# Patient Record
Sex: MALE | Race: WHITE | NOT HISPANIC OR LATINO | Employment: FULL TIME | URBAN - METROPOLITAN AREA
[De-identification: names, ages, dates, MRNs, and addresses within clinical notes are randomized per-mention and may not be internally consistent; named-entity substitution may affect disease eponyms.]

---

## 2017-06-02 ENCOUNTER — CONVERSION ENCOUNTER (OUTPATIENT)
Dept: ORTHOPEDIC SURGERY | Facility: CLINIC | Age: 47
End: 2017-06-02

## 2017-07-12 ENCOUNTER — CONVERSION ENCOUNTER (OUTPATIENT)
Dept: ORTHOPEDIC SURGERY | Facility: CLINIC | Age: 47
End: 2017-07-12

## 2017-07-13 ENCOUNTER — HOSPITAL ENCOUNTER (OUTPATIENT)
Dept: PHYSICAL THERAPY | Facility: HOSPITAL | Age: 47
Setting detail: RECURRING SERIES
Discharge: HOME | End: 2017-09-01
Attending: FAMILY MEDICINE | Admitting: FAMILY MEDICINE

## 2018-10-23 ENCOUNTER — HOSPITAL ENCOUNTER (OUTPATIENT)
Dept: OTHER | Facility: HOSPITAL | Age: 48
Discharge: HOME | End: 2018-10-23
Attending: NURSE PRACTITIONER | Admitting: NURSE PRACTITIONER

## 2018-10-23 ENCOUNTER — CONVERSION ENCOUNTER (OUTPATIENT)
Dept: ORTHOPEDIC SURGERY | Facility: CLINIC | Age: 48
End: 2018-10-23

## 2018-11-14 ENCOUNTER — CONVERSION ENCOUNTER (OUTPATIENT)
Dept: ORTHOPEDIC SURGERY | Facility: CLINIC | Age: 48
End: 2018-11-14

## 2018-11-19 ENCOUNTER — HOSPITAL ENCOUNTER (OUTPATIENT)
Dept: MRI IMAGING | Facility: HOSPITAL | Age: 48
Discharge: HOME | End: 2018-11-19
Attending: ORTHOPAEDIC SURGERY | Admitting: ORTHOPAEDIC SURGERY

## 2018-11-28 ENCOUNTER — CONVERSION ENCOUNTER (OUTPATIENT)
Dept: ORTHOPEDIC SURGERY | Facility: CLINIC | Age: 48
End: 2018-11-28

## 2018-12-05 ENCOUNTER — HOSPITAL ENCOUNTER (OUTPATIENT)
Dept: PHYSICAL THERAPY | Facility: HOSPITAL | Age: 48
Setting detail: RECURRING SERIES
Discharge: HOME | End: 2018-12-31
Attending: ORTHOPAEDIC SURGERY | Admitting: ORTHOPAEDIC SURGERY

## 2019-01-02 ENCOUNTER — CONVERSION ENCOUNTER (OUTPATIENT)
Dept: ORTHOPEDIC SURGERY | Facility: CLINIC | Age: 49
End: 2019-01-02

## 2019-06-04 VITALS
BODY MASS INDEX: 25.06 KG/M2 | HEIGHT: 72 IN | BODY MASS INDEX: 25.06 KG/M2 | RESPIRATION RATE: 18 BRPM | WEIGHT: 185 LBS | HEIGHT: 72 IN | DIASTOLIC BLOOD PRESSURE: 80 MMHG | DIASTOLIC BLOOD PRESSURE: 80 MMHG | BODY MASS INDEX: 24.68 KG/M2 | HEART RATE: 80 BPM | SYSTOLIC BLOOD PRESSURE: 121 MMHG | WEIGHT: 182.2 LBS | SYSTOLIC BLOOD PRESSURE: 136 MMHG | WEIGHT: 175.5 LBS | OXYGEN SATURATION: 95 % | OXYGEN SATURATION: 95 % | HEART RATE: 76 BPM | RESPIRATION RATE: 16 BRPM | WEIGHT: 185 LBS | HEART RATE: 89 BPM | WEIGHT: 185 LBS | SYSTOLIC BLOOD PRESSURE: 133 MMHG | DIASTOLIC BLOOD PRESSURE: 82 MMHG | OXYGEN SATURATION: 96 % | RESPIRATION RATE: 18 BRPM | HEIGHT: 72 IN | WEIGHT: 180.6 LBS | SYSTOLIC BLOOD PRESSURE: 127 MMHG | DIASTOLIC BLOOD PRESSURE: 81 MMHG | HEART RATE: 85 BPM | SYSTOLIC BLOOD PRESSURE: 120 MMHG | SYSTOLIC BLOOD PRESSURE: 130 MMHG | DIASTOLIC BLOOD PRESSURE: 77 MMHG | HEIGHT: 72 IN | HEART RATE: 82 BPM | DIASTOLIC BLOOD PRESSURE: 87 MMHG | BODY MASS INDEX: 25.06 KG/M2

## 2019-07-09 ENCOUNTER — OFFICE VISIT (OUTPATIENT)
Dept: FAMILY MEDICINE CLINIC | Facility: CLINIC | Age: 49
End: 2019-07-09

## 2019-07-09 VITALS
OXYGEN SATURATION: 95 % | RESPIRATION RATE: 16 BRPM | HEART RATE: 98 BPM | SYSTOLIC BLOOD PRESSURE: 116 MMHG | HEIGHT: 71 IN | DIASTOLIC BLOOD PRESSURE: 78 MMHG | BODY MASS INDEX: 25.48 KG/M2 | WEIGHT: 182 LBS | TEMPERATURE: 98.2 F

## 2019-07-09 DIAGNOSIS — E78.49 OTHER HYPERLIPIDEMIA: ICD-10-CM

## 2019-07-09 DIAGNOSIS — I10 ESSENTIAL HYPERTENSION: Primary | ICD-10-CM

## 2019-07-09 DIAGNOSIS — Z12.5 SCREENING PSA (PROSTATE SPECIFIC ANTIGEN): ICD-10-CM

## 2019-07-09 PROBLEM — E78.5 HYPERLIPIDEMIA: Status: ACTIVE | Noted: 2019-07-09

## 2019-07-09 PROBLEM — M75.100 NONTRAUMATIC TEAR OF ROTATOR CUFF: Status: ACTIVE | Noted: 2018-11-28

## 2019-07-09 PROCEDURE — 99213 OFFICE O/P EST LOW 20 MIN: CPT | Performed by: NURSE PRACTITIONER

## 2019-07-09 RX ORDER — MELOXICAM 15 MG/1
TABLET ORAL EVERY 24 HOURS
COMMUNITY
Start: 2018-11-28 | End: 2023-01-17

## 2019-07-09 RX ORDER — LISINOPRIL AND HYDROCHLOROTHIAZIDE 20; 12.5 MG/1; MG/1
TABLET ORAL
Refills: 3 | COMMUNITY
Start: 2019-04-15 | End: 2019-07-11 | Stop reason: SDUPTHER

## 2019-07-09 RX ORDER — METOPROLOL SUCCINATE 50 MG/1
TABLET, EXTENDED RELEASE ORAL
Refills: 3 | COMMUNITY
Start: 2019-04-15 | End: 2019-07-11 | Stop reason: SDUPTHER

## 2019-07-09 NOTE — PROGRESS NOTES
Chief Complaint   Patient presents with   • Hypertension        Subjective   Aditya Barnes is a 49 y.o. male who presents today for htn and chol fu.    HPI:  Pt is here to fu on b/p he is taking meds.  Doing well. He does not remember the chol being elevated.  He had a kidney stone last night he past on his own.  He is not scheduling the schedule surgery at this point.  Not fasting today  Aditya Barnes  has a past medical history of Hypertension and Smokeless tobacco use.    Allergies   Allergen Reactions   • Penicillin G Unknown (See Comments)       Current Outpatient Medications:   •  lisinopril-hydrochlorothiazide (PRINZIDE,ZESTORETIC) 20-12.5 MG per tablet, , Disp: , Rfl: 3  •  metoprolol succinate XL (TOPROL-XL) 50 MG 24 hr tablet, , Disp: , Rfl: 3  •  meloxicam (MOBIC) 15 MG tablet, Daily., Disp: , Rfl:   Past Medical History:   Diagnosis Date   • Hypertension    • Smokeless tobacco use      Past Surgical History:   Procedure Laterality Date   • CHOLECYSTECTOMY      [facility]   • CYSTOSCOPY BLADDER STONE LITHOTRIPSY      [facility]   • ROTATOR CUFF REPAIR      left     Social History     Socioeconomic History   • Marital status:      Spouse name: Not on file   • Number of children: Not on file   • Years of education: Not on file   • Highest education level: Not on file   Tobacco Use   • Smoking status: Never Smoker   • Smokeless tobacco: Current User   Substance and Sexual Activity   • Alcohol use: Yes   • Drug use: No     Family History   Problem Relation Age of Onset   • Thyroid disease Mother        Family history, surgical history, past medical history, Allergies and med's reviewed with patient today and updated in [employer] EMR.     ROS:  Review of Systems   Constitutional: Negative for fatigue.   Respiratory: Negative for cough.    Cardiovascular: Negative for chest pain.   Musculoskeletal: Positive for arthralgias.       OBJECTIVE:  Vitals:    07/09/19 1452   BP: 116/78   BP Location: Left arm   Patient Position:  "Sitting   Cuff Size: Small Adult   Pulse: 98   Resp: 16   Temp: 98.2 °F (36.8 °C)   TempSrc: Oral   SpO2: 95%   Weight: 82.6 kg (182 lb)   Height: 179.1 cm (70.5\")     Physical Exam   Constitutional: He appears well-developed and well-nourished.   Cardiovascular: Normal rate, regular rhythm and normal heart sounds. Exam reveals no gallop and no friction rub.   No murmur heard.  Pulmonary/Chest: Effort normal and breath sounds normal. No respiratory distress. He has no wheezes.   Skin: Skin is warm and dry.   Nursing note and vitals reviewed.      ASSESSMENT/ PLAN:    Aditya was seen today for hypertension.    Diagnoses and all orders for this visit:    Essential hypertension  -     Lipid Panel; Future  -     Comprehensive Metabolic Panel; Future    Other hyperlipidemia  -     Lipid Panel; Future  -     Comprehensive Metabolic Panel; Future    Screening PSA (prostate specific antigen)  -     PSA Screen; Future        Orders Placed Today:     No orders of the defined types were placed in this encounter.       Management Plan:     An After Visit Summary was printed and given to the patient at discharge.    Follow-up: Return in about 6 months (around 1/9/2020).    Martita Zhang, IMANI 7/9/2019 3:17 PM  This note was electronically signed.    "

## 2019-07-11 RX ORDER — LISINOPRIL AND HYDROCHLOROTHIAZIDE 20; 12.5 MG/1; MG/1
TABLET ORAL
Qty: 90 TABLET | Refills: 3 | Status: SHIPPED | OUTPATIENT
Start: 2019-07-11 | End: 2020-07-06

## 2019-07-11 RX ORDER — METOPROLOL SUCCINATE 50 MG/1
TABLET, EXTENDED RELEASE ORAL
Qty: 90 TABLET | Refills: 3 | Status: SHIPPED | OUTPATIENT
Start: 2019-07-11 | End: 2020-07-06

## 2019-07-11 NOTE — TELEPHONE ENCOUNTER
Patient came in and is [facility] he is going over there and waiting.

## 2019-07-14 ENCOUNTER — RESULTS ENCOUNTER (OUTPATIENT)
Dept: FAMILY MEDICINE CLINIC | Facility: CLINIC | Age: 49
End: 2019-07-14

## 2019-07-14 DIAGNOSIS — E78.49 OTHER HYPERLIPIDEMIA: ICD-10-CM

## 2019-07-14 DIAGNOSIS — I10 ESSENTIAL HYPERTENSION: ICD-10-CM

## 2019-07-14 DIAGNOSIS — Z12.5 SCREENING PSA (PROSTATE SPECIFIC ANTIGEN): ICD-10-CM

## 2019-07-19 LAB
ALBUMIN SERPL-MCNC: 4.6 G/DL (ref 3.5–5.5)
ALBUMIN/GLOB SERPL: 1.9 {RATIO} (ref 1.2–2.2)
ALP SERPL-CCNC: 53 IU/L (ref 39–117)
ALT SERPL-CCNC: 22 IU/L (ref 0–44)
AST SERPL-CCNC: 16 IU/L (ref 0–40)
BILIRUB SERPL-MCNC: 0.4 MG/DL (ref 0–1.2)
BUN SERPL-MCNC: 19 MG/DL (ref 6–24)
BUN/CREAT SERPL: 18 (ref 9–20)
CALCIUM SERPL-MCNC: 9.7 MG/DL (ref 8.7–10.2)
CHLORIDE SERPL-SCNC: 103 MMOL/L (ref 96–106)
CHOLEST SERPL-MCNC: 145 MG/DL (ref 100–199)
CO2 SERPL-SCNC: 23 MMOL/L (ref 20–29)
CREAT SERPL-MCNC: 1.04 MG/DL (ref 0.76–1.27)
GLOBULIN SER CALC-MCNC: 2.4 G/DL (ref 1.5–4.5)
GLUCOSE SERPL-MCNC: 96 MG/DL (ref 65–99)
HDLC SERPL-MCNC: 33 MG/DL
LDLC SERPL CALC-MCNC: 94 MG/DL (ref 0–99)
POTASSIUM SERPL-SCNC: 4.1 MMOL/L (ref 3.5–5.2)
PROT SERPL-MCNC: 7 G/DL (ref 6–8.5)
PSA SERPL-MCNC: 0.8 NG/ML (ref 0–4)
SODIUM SERPL-SCNC: 142 MMOL/L (ref 134–144)
TRIGL SERPL-MCNC: 88 MG/DL (ref 0–149)
VLDLC SERPL CALC-MCNC: 18 MG/DL (ref 5–40)

## 2019-07-22 ENCOUNTER — TELEPHONE (OUTPATIENT)
Dept: FAMILY MEDICINE CLINIC | Facility: CLINIC | Age: 49
End: 2019-07-22

## 2019-07-22 NOTE — TELEPHONE ENCOUNTER
----- Message from IMANI Cortes sent at 7/22/2019  8:28 AM EDT -----  Please inform pt the labs all look normal.  His triglycerides were normal this yr.  His good chol was 33 and it should be above 39.  Exercise and a health diet will improve that.  All others look great

## 2020-01-13 ENCOUNTER — OFFICE VISIT (OUTPATIENT)
Dept: FAMILY MEDICINE CLINIC | Facility: CLINIC | Age: 50
End: 2020-01-13

## 2020-01-13 VITALS
RESPIRATION RATE: 16 BRPM | BODY MASS INDEX: 25.48 KG/M2 | SYSTOLIC BLOOD PRESSURE: 133 MMHG | WEIGHT: 182 LBS | HEIGHT: 71 IN | OXYGEN SATURATION: 99 % | TEMPERATURE: 98 F | DIASTOLIC BLOOD PRESSURE: 86 MMHG | HEART RATE: 69 BPM

## 2020-01-13 DIAGNOSIS — I10 ESSENTIAL HYPERTENSION: Primary | ICD-10-CM

## 2020-01-13 PROCEDURE — 99212 OFFICE O/P EST SF 10 MIN: CPT | Performed by: NURSE PRACTITIONER

## 2020-01-13 NOTE — ASSESSMENT & PLAN NOTE
Hypertension is improving with treatment.  Continue current medications.  Blood pressure will be reassessed August when physicals to.

## 2020-01-13 NOTE — PROGRESS NOTES
Chief Complaint   Patient presents with   • Hypertension        Subjective   Aditya Barnes is a 49 y.o. male who presents today for hypertension    HPI: Patient was started on lisinopril hydrochlorothiazide due to increase in blood pressure several months ago.  He is not had any side effects.  He also is taking his Toprol without any problems.  He has a good energy level.  With no chest pain shortness of breath or complications.  He had labs done in July.  He does not take his blood pressure at home    Aditya Barnes  has a past medical history of Hypertension and Smokeless tobacco use.    Allergies   Allergen Reactions   • Penicillin G Rash       Current Outpatient Medications:   •  lisinopril-hydrochlorothiazide (PRINZIDE,ZESTORETIC) 20-12.5 MG per tablet, TAKE 1 TABLET BY MOUTH IN THE MORNING, Disp: 90 tablet, Rfl: 3  •  metoprolol succinate XL (TOPROL-XL) 50 MG 24 hr tablet, TAKE 1 TABLET BY MOUTH ONCE DAILY, Disp: 90 tablet, Rfl: 3  •  meloxicam (MOBIC) 15 MG tablet, Daily., Disp: , Rfl:   Past Medical History:   Diagnosis Date   • Hypertension    • Smokeless tobacco use      Past Surgical History:   Procedure Laterality Date   • CHOLECYSTECTOMY      [facility]   • CYSTOSCOPY BLADDER STONE LITHOTRIPSY      [facility]   • ROTATOR CUFF REPAIR      left     Social History     Socioeconomic History   • Marital status:      Spouse name: Not on file   • Number of children: Not on file   • Years of education: Not on file   • Highest education level: Not on file   Tobacco Use   • Smoking status: Never Smoker   • Smokeless tobacco: Current User   Substance and Sexual Activity   • Alcohol use: Yes   • Drug use: No     Family History   Problem Relation Age of Onset   • Thyroid disease Mother        Family history, surgical history, past medical history, Allergies and med's reviewed with patient today and updated in [employer] EMR.   PHQ-2 Depression Screening  Little interest or pleasure in doing things?     Feeling down, depressed,  "or hopeless?     PHQ-2 Total Score     PHQ-9 Depression Screening  Little interest or pleasure in doing things?     Feeling down, depressed, or hopeless?     Trouble falling or staying asleep, or sleeping too much?     Feeling tired or having little energy?     Poor appetite or overeating?     Feeling bad about yourself - or that you are a failure or have let yourself or your family down?     Trouble concentrating on things, such as reading the newspaper or watching television?     Moving or speaking so slowly that other people could have noticed? Or the opposite - being so fidgety or restless that you have been moving around a lot more than usual?     Thoughts that you would be better off dead, or of hurting yourself in some way?     PHQ-9 Total Score     If you checked off any problems, how difficult have these problems made it for you to do your work, take care of things at home, or get along with other people?       ROS:  Review of Systems   Constitutional: Negative for fatigue.   Respiratory: Negative for cough and shortness of breath.    Cardiovascular: Negative for chest pain.   Musculoskeletal: Negative for arthralgias.       OBJECTIVE:  Vitals:    01/13/20 1607   BP: 133/86   BP Location: Right arm   Patient Position: Sitting   Cuff Size: Adult   Pulse: 69   Resp: 16   Temp: 98 °F (36.7 °C)   TempSrc: Oral   SpO2: 99%   Weight: 82.6 kg (182 lb)   Height: 179.1 cm (70.5\")     Physical Exam   Constitutional: He appears well-developed and well-nourished.   Cardiovascular: Normal rate, regular rhythm and normal heart sounds. Exam reveals no gallop and no friction rub.   No murmur heard.  Pulmonary/Chest: Effort normal and breath sounds normal. He has no wheezes. He has no rales.   Neurological: He is alert.   Skin: Skin is warm and dry.   Nursing note and vitals reviewed.      ASSESSMENT/ PLAN:    Aditya was seen today for hypertension.    Diagnoses and all orders for this visit:    Essential " hypertension  Comments:  Discussed medication. monitoring of blood pressure.  Blood pressure is stable recheck in the summer.        Orders Placed Today:     No orders of the defined types were placed in this encounter.       Management Plan:     An After Visit Summary was printed and given to the patient at discharge.    Follow-up: Return in about 7 months (around 8/13/2020).    IMANI Moncada 1/13/2020 5:14 PM  This note was electronically signed.

## 2020-06-08 ENCOUNTER — TELEPHONE (OUTPATIENT)
Dept: FAMILY MEDICINE CLINIC | Facility: CLINIC | Age: 50
End: 2020-06-08

## 2020-06-08 NOTE — TELEPHONE ENCOUNTER
Can you please get the records from the ER?  Inform the patient that if he wants to go directly to urology that is fine with me but I need to get the CT and records from [facility] to refer him.  If he needs to see me this week that is fine as well.

## 2020-06-08 NOTE — TELEPHONE ENCOUNTER
Seen in [facility] ED for kidney stone.  Do we need to sent over a referral for urology or does he need an appt with you?

## 2020-06-09 ENCOUNTER — TELEPHONE (OUTPATIENT)
Dept: FAMILY MEDICINE CLINIC | Facility: CLINIC | Age: 50
End: 2020-06-09

## 2020-06-09 PROBLEM — N20.0 KIDNEY STONES: Status: ACTIVE | Noted: 2020-06-09

## 2020-06-09 NOTE — TELEPHONE ENCOUNTER
I have put in a urology referral after reviewing patient's chart from the emergency department.  He needs to see urology as soon as possible.  If someone can see him today he can cancel his appointment here and be seen there.  His kidney stone is too large to pass.  I have the CT to be faxed.  Please let me know

## 2020-06-09 NOTE — TELEPHONE ENCOUNTER
Patient scheduled to see Dr. Cantu at 1:30pm today in [place] office. Patient notified and Records faxed to [facility]. Cancelled appointment for today with Martita.

## 2020-06-18 PROCEDURE — 82365 CALCULUS SPECTROSCOPY: CPT | Performed by: NEUROLOGICAL SURGERY

## 2020-06-19 ENCOUNTER — LAB REQUISITION (OUTPATIENT)
Dept: LAB | Facility: HOSPITAL | Age: 50
End: 2020-06-19

## 2020-06-19 DIAGNOSIS — N20.1 CALCULUS OF URETER: ICD-10-CM

## 2020-07-02 LAB
COD CRY STONE QL IR: 10 %
COLOR STONE: NORMAL
COM CRY STONE QL IR: 90 %
COMPN STONE: NORMAL
LABORATORY COMMENT REPORT: NORMAL
Lab: NORMAL
Lab: NORMAL
PHOTO: NORMAL
SIZE STONE: NORMAL MM
SPECIMEN SOURCE: NORMAL
WT STONE: 99 MG

## 2020-07-06 RX ORDER — LISINOPRIL AND HYDROCHLOROTHIAZIDE 20; 12.5 MG/1; MG/1
TABLET ORAL
Qty: 90 TABLET | Refills: 0 | Status: SHIPPED | OUTPATIENT
Start: 2020-07-06 | End: 2020-10-12

## 2020-07-06 RX ORDER — METOPROLOL SUCCINATE 50 MG/1
TABLET, EXTENDED RELEASE ORAL
Qty: 90 TABLET | Refills: 0 | Status: SHIPPED | OUTPATIENT
Start: 2020-07-06 | End: 2020-10-12

## 2020-07-13 ENCOUNTER — OFFICE VISIT (OUTPATIENT)
Dept: FAMILY MEDICINE CLINIC | Facility: CLINIC | Age: 50
End: 2020-07-13

## 2020-07-13 VITALS
TEMPERATURE: 97.5 F | SYSTOLIC BLOOD PRESSURE: 116 MMHG | HEIGHT: 71 IN | RESPIRATION RATE: 16 BRPM | WEIGHT: 175 LBS | DIASTOLIC BLOOD PRESSURE: 76 MMHG | HEART RATE: 67 BPM | BODY MASS INDEX: 24.5 KG/M2 | OXYGEN SATURATION: 96 %

## 2020-07-13 DIAGNOSIS — I10 ESSENTIAL HYPERTENSION: Primary | ICD-10-CM

## 2020-07-13 DIAGNOSIS — E78.49 OTHER HYPERLIPIDEMIA: ICD-10-CM

## 2020-07-13 DIAGNOSIS — Z12.5 SCREENING PSA (PROSTATE SPECIFIC ANTIGEN): ICD-10-CM

## 2020-07-13 PROCEDURE — 99213 OFFICE O/P EST LOW 20 MIN: CPT | Performed by: NURSE PRACTITIONER

## 2020-07-13 NOTE — PROGRESS NOTES
Chief Complaint   Patient presents with   • Hypertension        Subjective   Aditya Barnes is a 50 y.o. male who presents today for hypertension, hyperlipidemia    HPI: Patient is here to follow-up on blood pressure and hyperlipidemia.  He is due for labs at this time.  He is also due a PSA.  He is declined a colonoscopy at this time.  He recently had a kidney stone that was removed and he is doing well at this point.  His blood pressure is under good control and he has no side effects from the medication.  He does have a question about weight loss he is to quit drinking beer at night and has lost about 8 pounds.  He has no other symptoms are changes in his diet or exercise level.  He wants to know if the stopping drinking alcohol can account for that.    Aditya Barnes  has a past medical history of Hyperlipidemia, Hypertension, and Smokeless tobacco use.    Allergies   Allergen Reactions   • Penicillin G Rash       Current Outpatient Medications:   •  lisinopril-hydrochlorothiazide (PRINZIDE,ZESTORETIC) 20-12.5 MG per tablet, TAKE 1 TABLET BY MOUTH IN THE MORNING, Disp: 90 tablet, Rfl: 0  •  metoprolol succinate XL (TOPROL-XL) 50 MG 24 hr tablet, Take 1 tablet by mouth once daily, Disp: 90 tablet, Rfl: 0  •  diclofenac (VOLTAREN) 75 MG EC tablet, , Disp: , Rfl:   •  meloxicam (MOBIC) 15 MG tablet, Daily., Disp: , Rfl:   •  promethazine (PHENERGAN) 25 MG tablet, , Disp: , Rfl:   Past Medical History:   Diagnosis Date   • Hyperlipidemia    • Hypertension    • Smokeless tobacco use      Past Surgical History:   Procedure Laterality Date   • CHOLECYSTECTOMY      [facility]   • CYSTOSCOPY BLADDER STONE LITHOTRIPSY      [facility]   • ROTATOR CUFF REPAIR      left     Social History     Socioeconomic History   • Marital status:      Spouse name: Not on file   • Number of children: Not on file   • Years of education: Not on file   • Highest education level: Not on file   Tobacco Use   • Smoking status: Never Smoker   •  Smokeless tobacco: Current User     Types: Snuff   Substance and Sexual Activity   • Alcohol use: Yes     Comment: social   • Drug use: No     Family History   Problem Relation Age of Onset   • Thyroid disease Mother        Family history, surgical history, past medical history, Allergies and med's reviewed with patient today and updated in [employer] EMR.   PHQ-2 Depression Screening  Little interest or pleasure in doing things? 0   Feeling down, depressed, or hopeless? 0   PHQ-2 Total Score 0   PHQ-9 Depression Screening  Little interest or pleasure in doing things? 0   Feeling down, depressed, or hopeless? 0   Trouble falling or staying asleep, or sleeping too much?     Feeling tired or having little energy?     Poor appetite or overeating?     Feeling bad about yourself - or that you are a failure or have let yourself or your family down?     Trouble concentrating on things, such as reading the newspaper or watching television?     Moving or speaking so slowly that other people could have noticed? Or the opposite - being so fidgety or restless that you have been moving around a lot more than usual?     Thoughts that you would be better off dead, or of hurting yourself in some way?     PHQ-9 Total Score 0   If you checked off any problems, how difficult have these problems made it for you to do your work, take care of things at home, or get along with other people?       ROS:  Review of Systems   Constitutional: Negative for fatigue.   Respiratory: Negative for cough and shortness of breath.    Cardiovascular: Negative for chest pain.   Gastrointestinal: Negative for abdominal pain and constipation.   Genitourinary: Negative for dysuria and frequency.   Musculoskeletal: Negative for arthralgias.       OBJECTIVE:  Vitals:    07/13/20 1614   BP: 116/76   BP Location: Left arm   Patient Position: Sitting   Cuff Size: Adult   Pulse: 67   Resp: 16   Temp: 97.5 °F (36.4 °C)   TempSrc: Temporal   SpO2: 96%   Weight: 79.4 kg  "(175 lb)   Height: 179.1 cm (70.5\")     Physical Exam   Constitutional: He appears well-developed and well-nourished.   Cardiovascular: Normal rate, regular rhythm and normal heart sounds. Exam reveals no gallop and no friction rub.   No murmur heard.  Pulmonary/Chest: Effort normal and breath sounds normal. He has no wheezes. He has no rales.   Neurological: He is alert.   Skin: Skin is warm and dry.   Nursing note and vitals reviewed.      ASSESSMENT/ PLAN:    Aditya was seen today for hypertension.    Diagnoses and all orders for this visit:    Essential hypertension  Comments:  Continue current blood pressure medications.  Labs as discussed.  Orders:  -     Comprehensive Metabolic Panel  -     Lipid Panel    Other hyperlipidemia  -     Comprehensive Metabolic Panel  -     Lipid Panel    Screening PSA (prostate specific antigen)  -     PSA Screen        Orders Placed Today:     No orders of the defined types were placed in this encounter.       Management Plan:     An After Visit Summary was printed and given to the patient at discharge.    Follow-up: Return in about 1 year (around 7/13/2021) for Recheck htn.    IMANI Moncada 7/13/2020 16:32  This note was electronically signed.    "

## 2020-10-12 RX ORDER — METOPROLOL SUCCINATE 50 MG/1
TABLET, EXTENDED RELEASE ORAL
Qty: 90 TABLET | Refills: 0 | Status: SHIPPED | OUTPATIENT
Start: 2020-10-12 | End: 2021-01-06 | Stop reason: SDUPTHER

## 2020-10-12 RX ORDER — LISINOPRIL AND HYDROCHLOROTHIAZIDE 20; 12.5 MG/1; MG/1
TABLET ORAL
Qty: 90 TABLET | Refills: 0 | Status: SHIPPED | OUTPATIENT
Start: 2020-10-12 | End: 2021-01-06 | Stop reason: SDUPTHER

## 2021-01-06 ENCOUNTER — OFFICE VISIT (OUTPATIENT)
Dept: FAMILY MEDICINE CLINIC | Facility: CLINIC | Age: 51
End: 2021-01-06

## 2021-01-06 VITALS
WEIGHT: 183 LBS | SYSTOLIC BLOOD PRESSURE: 123 MMHG | OXYGEN SATURATION: 97 % | HEIGHT: 71 IN | DIASTOLIC BLOOD PRESSURE: 80 MMHG | BODY MASS INDEX: 25.62 KG/M2 | RESPIRATION RATE: 16 BRPM | HEART RATE: 71 BPM | TEMPERATURE: 97.8 F

## 2021-01-06 DIAGNOSIS — I10 ESSENTIAL HYPERTENSION: Primary | Chronic | ICD-10-CM

## 2021-01-06 DIAGNOSIS — E78.49 OTHER HYPERLIPIDEMIA: ICD-10-CM

## 2021-01-06 DIAGNOSIS — Z12.5 SCREENING PSA (PROSTATE SPECIFIC ANTIGEN): ICD-10-CM

## 2021-01-06 PROCEDURE — 99213 OFFICE O/P EST LOW 20 MIN: CPT | Performed by: NURSE PRACTITIONER

## 2021-01-06 RX ORDER — METOPROLOL SUCCINATE 50 MG/1
50 TABLET, EXTENDED RELEASE ORAL DAILY
Qty: 90 TABLET | Refills: 3 | Status: SHIPPED | OUTPATIENT
Start: 2021-01-06 | End: 2021-12-29 | Stop reason: SDUPTHER

## 2021-01-06 RX ORDER — LISINOPRIL AND HYDROCHLOROTHIAZIDE 20; 12.5 MG/1; MG/1
1 TABLET ORAL EVERY MORNING
Qty: 90 TABLET | Refills: 3 | Status: SHIPPED | OUTPATIENT
Start: 2021-01-06 | End: 2021-12-29 | Stop reason: SDUPTHER

## 2021-01-06 NOTE — PROGRESS NOTES
"Chief Complaint  Hypertension    Subjective          Aditya Barnes presents to [facility] for hypertensive recheck  History of Present Illness    Patient here for 6-month follow-up on his blood pressure he is adherent to his medications without any side effect.  He did not get his labs drawn the last time he was here.  He has no other complaints.  Previously had a kidney stone has not had any recurrence.    Objective   Vital Signs:   /80 (BP Location: Left arm, Patient Position: Sitting, Cuff Size: Adult)   Pulse 71   Temp 97.8 °F (36.6 °C) (Infrared)   Resp 16   Ht 179.1 cm (70.5\")   Wt 83 kg (183 lb)   SpO2 97%   BMI 25.89 kg/m²     Physical Exam  Vitals signs and nursing note reviewed.   Constitutional:       Appearance: He is well-developed.   Cardiovascular:      Rate and Rhythm: Normal rate and regular rhythm.      Heart sounds: Normal heart sounds. No murmur. No friction rub. No gallop.    Pulmonary:      Effort: Pulmonary effort is normal.      Breath sounds: Normal breath sounds. No wheezing or rales.   Skin:     General: Skin is warm and dry.   Neurological:      Mental Status: He is alert.        Result Review :                 Assessment and Plan    Problem List Items Addressed This Visit        Active Problems    Hypertension - Primary    Relevant Medications    metoprolol succinate XL (TOPROL-XL) 50 MG 24 hr tablet    lisinopril-hydrochlorothiazide (PRINZIDE,ZESTORETIC) 20-12.5 MG per tablet    Other Relevant Orders    Comprehensive Metabolic Panel    Lipid Panel    Hyperlipidemia    Relevant Orders    Comprehensive Metabolic Panel    Lipid Panel    Screening PSA (prostate specific antigen)    Relevant Orders    PSA Screen          Follow Up   Return in about 1 year (around 1/6/2022) for Recheck htn.  Patient was given instructions and counseling regarding his condition or for health maintenance advice. Please see specific information pulled into the AVS " if appropriate.

## 2021-01-16 LAB
ALBUMIN SERPL-MCNC: 4.6 G/DL (ref 3.8–4.9)
ALBUMIN/GLOB SERPL: 1.8 {RATIO} (ref 1.2–2.2)
ALP SERPL-CCNC: 57 IU/L (ref 39–117)
ALT SERPL-CCNC: 26 IU/L (ref 0–44)
AST SERPL-CCNC: 17 IU/L (ref 0–40)
BILIRUB SERPL-MCNC: 0.4 MG/DL (ref 0–1.2)
BUN SERPL-MCNC: 20 MG/DL (ref 6–24)
BUN/CREAT SERPL: 18 (ref 9–20)
CALCIUM SERPL-MCNC: 9.5 MG/DL (ref 8.7–10.2)
CHLORIDE SERPL-SCNC: 107 MMOL/L (ref 96–106)
CHOLEST SERPL-MCNC: 159 MG/DL (ref 100–199)
CO2 SERPL-SCNC: 24 MMOL/L (ref 20–29)
CREAT SERPL-MCNC: 1.1 MG/DL (ref 0.76–1.27)
GLOBULIN SER CALC-MCNC: 2.5 G/DL (ref 1.5–4.5)
GLUCOSE SERPL-MCNC: 103 MG/DL (ref 65–99)
HDLC SERPL-MCNC: 38 MG/DL
LDLC SERPL CALC-MCNC: 101 MG/DL (ref 0–99)
POTASSIUM SERPL-SCNC: 4.2 MMOL/L (ref 3.5–5.2)
PROT SERPL-MCNC: 7.1 G/DL (ref 6–8.5)
PSA SERPL-MCNC: 0.8 NG/ML (ref 0–4)
SODIUM SERPL-SCNC: 143 MMOL/L (ref 134–144)
TRIGL SERPL-MCNC: 110 MG/DL (ref 0–149)
VLDLC SERPL CALC-MCNC: 20 MG/DL (ref 5–40)

## 2021-12-29 ENCOUNTER — TELEPHONE (OUTPATIENT)
Dept: FAMILY MEDICINE CLINIC | Facility: CLINIC | Age: 51
End: 2021-12-29

## 2021-12-29 ENCOUNTER — OFFICE VISIT (OUTPATIENT)
Dept: FAMILY MEDICINE CLINIC | Facility: CLINIC | Age: 51
End: 2021-12-29

## 2021-12-29 VITALS
OXYGEN SATURATION: 97 % | BODY MASS INDEX: 26.6 KG/M2 | HEIGHT: 71 IN | HEART RATE: 86 BPM | DIASTOLIC BLOOD PRESSURE: 86 MMHG | SYSTOLIC BLOOD PRESSURE: 120 MMHG | TEMPERATURE: 97.6 F | WEIGHT: 190 LBS | RESPIRATION RATE: 16 BRPM

## 2021-12-29 DIAGNOSIS — Z12.5 SCREENING PSA (PROSTATE SPECIFIC ANTIGEN): ICD-10-CM

## 2021-12-29 DIAGNOSIS — Z53.20 COLON CANCER SCREENING DECLINED: ICD-10-CM

## 2021-12-29 DIAGNOSIS — F17.200 TOBACCO DEPENDENCE SYNDROME: ICD-10-CM

## 2021-12-29 DIAGNOSIS — E78.49 OTHER HYPERLIPIDEMIA: ICD-10-CM

## 2021-12-29 DIAGNOSIS — I10 PRIMARY HYPERTENSION: Primary | ICD-10-CM

## 2021-12-29 DIAGNOSIS — R63.5 WEIGHT GAIN: ICD-10-CM

## 2021-12-29 PROCEDURE — 99214 OFFICE O/P EST MOD 30 MIN: CPT | Performed by: NURSE PRACTITIONER

## 2021-12-29 RX ORDER — METOPROLOL SUCCINATE 50 MG/1
50 TABLET, EXTENDED RELEASE ORAL DAILY
Qty: 90 TABLET | Refills: 3 | Status: SHIPPED | OUTPATIENT
Start: 2021-12-29 | End: 2023-01-08

## 2021-12-29 RX ORDER — LISINOPRIL AND HYDROCHLOROTHIAZIDE 20; 12.5 MG/1; MG/1
1 TABLET ORAL EVERY MORNING
Qty: 90 TABLET | Refills: 3 | Status: SHIPPED | OUTPATIENT
Start: 2021-12-29 | End: 2023-01-08

## 2021-12-29 NOTE — ASSESSMENT & PLAN NOTE
Tobacco use is unchanged.  Not motivated  Tobacco use will be reassessed at the next regular appointment.

## 2021-12-29 NOTE — PROGRESS NOTES
Chief Complaint  Hypertension and Hyperlipidemia    Subjective          Aditya Barnes presents to [facility] for hypertension, hyperlipidemia    History of Present Illness    Patient is here for follow-up on hypertension. He is taking his medicines compliantly. He is declining colonoscopy or Cologuard. He does report he has had some weight gain that he thought was disproportional what he is eating. His mother has thyroid disorder. He is not excessively fatigued but would like that checked. He is not due for his PSA till January. He has no symptoms of BPH. He has had some hyperlipidemia that needs to be rechecked and he is supposed to be managed that by diet and exercise. He does continue to use smokeless tobacco.      Aditya Barnes  has a past medical history of Hyperlipidemia, Hypertension, and Smokeless tobacco use.      Review of Systems   Constitutional: Positive for unexpected weight change. Negative for fatigue.   HENT: Negative for ear pain, sinus pain and sore throat.    Eyes: Negative for pain.   Respiratory: Negative for cough and shortness of breath.    Cardiovascular: Negative for chest pain.   Gastrointestinal: Negative for abdominal pain, diarrhea and nausea.   Endocrine: Negative for polyuria.   Genitourinary: Negative for decreased urine volume and dysuria.   Musculoskeletal: Negative for arthralgias.   Skin: Negative for rash.   Allergic/Immunologic: Negative for environmental allergies.   Neurological: Negative for dizziness, numbness and headaches.   Hematological: Does not bruise/bleed easily.   Psychiatric/Behavioral: The patient is not nervous/anxious.         Objective       Current Outpatient Medications:   •  lisinopril-hydrochlorothiazide (PRINZIDE,ZESTORETIC) 20-12.5 MG per tablet, Take 1 tablet by mouth Every Morning., Disp: 90 tablet, Rfl: 3  •  metoprolol succinate XL (TOPROL-XL) 50 MG 24 hr tablet, Take 1 tablet by mouth Daily., Disp: 90 tablet, Rfl:  "3  •  diclofenac (VOLTAREN) 75 MG EC tablet, , Disp: , Rfl:   •  meloxicam (MOBIC) 15 MG tablet, Daily., Disp: , Rfl:     Vital Signs:      /86 (BP Location: Right arm, Patient Position: Sitting, Cuff Size: Adult)   Pulse 86   Temp 97.6 °F (36.4 °C) (Infrared)   Resp 16   Ht 179.1 cm (70.5\")   Wt 86.2 kg (190 lb)   SpO2 97%   BMI 26.88 kg/m²     Vitals:    12/29/21 0917   BP: 120/86   BP Location: Right arm   Patient Position: Sitting   Cuff Size: Adult   Pulse: 86   Resp: 16   Temp: 97.6 °F (36.4 °C)   TempSrc: Infrared   SpO2: 97%   Weight: 86.2 kg (190 lb)   Height: 179.1 cm (70.5\")      Physical Exam  Vitals and nursing note reviewed.   Constitutional:       Appearance: He is well-developed.   HENT:      Head: Normocephalic.   Cardiovascular:      Rate and Rhythm: Normal rate and regular rhythm.      Heart sounds: Normal heart sounds. No murmur heard.  No friction rub. No gallop.    Pulmonary:      Effort: Pulmonary effort is normal.      Breath sounds: Normal breath sounds. No wheezing or rales.   Abdominal:      General: Bowel sounds are normal.      Palpations: Abdomen is soft.      Tenderness: There is no abdominal tenderness.   Musculoskeletal:      Cervical back: Normal range of motion and neck supple. No tenderness.   Skin:     General: Skin is warm and dry.   Neurological:      Mental Status: He is alert.   Psychiatric:         Mood and Affect: Mood normal.         Behavior: Behavior normal.         Thought Content: Thought content normal.         Judgment: Judgment normal.        Result Review :                PHQ-9 Depression Screening  Little interest or pleasure in doing things?     Feeling down, depressed, or hopeless?     Trouble falling or staying asleep, or sleeping too much?     Feeling tired or having little energy?     Poor appetite or overeating?     Feeling bad about yourself - or that you are a failure or have let yourself or your family down?     Trouble concentrating on " things, such as reading the newspaper or watching television?     Moving or speaking so slowly that other people could have noticed? Or the opposite - being so fidgety or restless that you have been moving around a lot more than usual?     Thoughts that you would be better off dead, or of hurting yourself in some way?     PHQ-9 Total Score     If you checked off any problems, how difficult have these problems made it for you to do your work, take care of things at home, or get along with other people?             Assessment and Plan    Diagnoses and all orders for this visit:    1. Primary hypertension (Primary)  Assessment & Plan:  Hypertension is improving with treatment.  Continue current treatment regimen.  Blood pressure will be reassessed at the next regular appointment.    Orders:  -     Comprehensive Metabolic Panel    2. Other hyperlipidemia  Assessment & Plan:  Lipid abnormalities are unchanged.  rechecking labs, discussed management  Lipids will be reassessed in 1 year.    Orders:  -     Lipid Panel    3. Weight gain  -     TSH    4. Screening PSA (prostate specific antigen)  -     PSA Screen    5. Tobacco dependence syndrome  Assessment & Plan:  Tobacco use is unchanged.  Not motivated  Tobacco use will be reassessed at the next regular appointment.      6. Colon cancer screening declined       Problem List Items Addressed This Visit        Active Problems    Hypertension - Primary    Current Assessment & Plan     Hypertension is improving with treatment.  Continue current treatment regimen.  Blood pressure will be reassessed at the next regular appointment.         Relevant Orders    Comprehensive Metabolic Panel    Tobacco dependence syndrome    Current Assessment & Plan     Tobacco use is unchanged.  Not motivated  Tobacco use will be reassessed at the next regular appointment.         Hyperlipidemia    Current Assessment & Plan     Lipid abnormalities are unchanged.  rechecking labs, discussed  management  Lipids will be reassessed in 1 year.         Relevant Orders    Lipid Panel    Screening PSA (prostate specific antigen)    Relevant Orders    PSA Screen      Other Visit Diagnoses     Weight gain        Relevant Orders    TSH    Colon cancer screening declined              Follow Up   No follow-ups on file.  Patient was given instructions and counseling regarding his condition or for health maintenance advice. Please see specific information pulled into the AVS if appropriate.

## 2021-12-29 NOTE — ASSESSMENT & PLAN NOTE
Lipid abnormalities are unchanged.  rechecking labs, discussed management  Lipids will be reassessed in 1 year.

## 2022-01-20 ENCOUNTER — TELEPHONE (OUTPATIENT)
Dept: FAMILY MEDICINE CLINIC | Facility: CLINIC | Age: 52
End: 2022-01-20

## 2022-01-20 NOTE — TELEPHONE ENCOUNTER
----- Message from Lubna Dhaliwal MA sent at 12/29/2021  9:33 AM EST -----  Regarding: Labs  Call patient to let him know he is due to repeat his PSA, lucas has put the order in already.

## 2022-02-02 ENCOUNTER — CLINICAL SUPPORT (OUTPATIENT)
Dept: FAMILY MEDICINE CLINIC | Facility: CLINIC | Age: 52
End: 2022-02-02

## 2022-02-02 DIAGNOSIS — R73.03 PRE-DIABETES: Primary | ICD-10-CM

## 2022-02-02 LAB
ALBUMIN SERPL-MCNC: 4.7 G/DL (ref 3.8–4.9)
ALBUMIN/GLOB SERPL: 2.1 {RATIO} (ref 1.2–2.2)
ALP SERPL-CCNC: 61 IU/L (ref 44–121)
ALT SERPL-CCNC: 30 IU/L (ref 0–44)
AST SERPL-CCNC: 17 IU/L (ref 0–40)
BILIRUB SERPL-MCNC: 0.4 MG/DL (ref 0–1.2)
BUN SERPL-MCNC: 17 MG/DL (ref 6–24)
BUN/CREAT SERPL: 15 (ref 9–20)
CALCIUM SERPL-MCNC: 9.3 MG/DL (ref 8.7–10.2)
CHLORIDE SERPL-SCNC: 104 MMOL/L (ref 96–106)
CHOLEST SERPL-MCNC: 154 MG/DL (ref 100–199)
CO2 SERPL-SCNC: 23 MMOL/L (ref 20–29)
CREAT SERPL-MCNC: 1.17 MG/DL (ref 0.76–1.27)
EXPIRATION DATE: NORMAL
GLOBULIN SER CALC-MCNC: 2.2 G/DL (ref 1.5–4.5)
GLUCOSE SERPL-MCNC: 109 MG/DL (ref 65–99)
HBA1C MFR BLD: 5.5 %
HDLC SERPL-MCNC: 31 MG/DL
LDLC SERPL CALC-MCNC: 101 MG/DL (ref 0–99)
Lab: 881
POTASSIUM SERPL-SCNC: 3.8 MMOL/L (ref 3.5–5.2)
PROT SERPL-MCNC: 6.9 G/DL (ref 6–8.5)
PSA SERPL-MCNC: 0.9 NG/ML (ref 0–4)
SODIUM SERPL-SCNC: 142 MMOL/L (ref 134–144)
TRIGL SERPL-MCNC: 120 MG/DL (ref 0–149)
TSH SERPL DL<=0.005 MIU/L-ACNC: 1.53 UIU/ML (ref 0.45–4.5)
VLDLC SERPL CALC-MCNC: 22 MG/DL (ref 5–40)

## 2022-02-02 PROCEDURE — 83036 HEMOGLOBIN GLYCOSYLATED A1C: CPT | Performed by: NURSE PRACTITIONER

## 2022-12-08 ENCOUNTER — TREATMENT (OUTPATIENT)
Dept: PHYSICAL THERAPY | Facility: CLINIC | Age: 52
End: 2022-12-08

## 2022-12-08 DIAGNOSIS — M54.2 NECK PAIN ON RIGHT SIDE: Primary | ICD-10-CM

## 2022-12-08 DIAGNOSIS — M54.12 RADICULOPATHY, CERVICAL: ICD-10-CM

## 2022-12-08 PROCEDURE — 97110 THERAPEUTIC EXERCISES: CPT | Performed by: PHYSICAL THERAPIST

## 2022-12-08 PROCEDURE — 97161 PT EVAL LOW COMPLEX 20 MIN: CPT | Performed by: PHYSICAL THERAPIST

## 2022-12-08 PROCEDURE — 97012 MECHANICAL TRACTION THERAPY: CPT | Performed by: PHYSICAL THERAPIST

## 2022-12-08 NOTE — PROGRESS NOTES
Physical Therapy Initial Evaluation and Plan of Care  [address], [address], [place]  [address]    Patient: Aditya Barnes   : 1970  Diagnosis/ICD-10 Code:  Neck pain on right side [M54.2]  Referring practitioner: self-referral  Date of Initial Visit: 2022  Today's Date: 2022  Patient seen for 1 sessions             Subjective Evaluation    History of Present Illness  Mechanism of injury: Patient is a 52 y.o. WM who returns to PT self-referred for R sided neck pain.  He reports previous relief with traction, manual therapy and heat.  Pain rating = 7/10 and increases with walking, driving, sleeping on sides, lifting.  Pain decreases with lying supine, moving.  Personal goals:  Decrease pain.  Works as manager at [employer].  He is R hand dominant.  History of L shoulder RCR.    Patient Goals  Patient goals for therapy: decreased pain             Objective NDI score indicates 20% impairment with limitations in walking, driving, sleeping on sides, lifting.  Cervical AROM is WNL all planes, extension increases pain.  B shoulders are WNL AROM.  MMT = 5/5 B UEs.  Tenderness and muscle guarding R UT, CPSM, subocc mm.  Forward head and protracted shoulders noted.        Assessment & Plan     Assessment  Impairments: abnormal muscle tone, activity intolerance, lacks appropriate home exercise program and pain with function  Functional Limitations: carrying objects, lifting, sleeping, walking, uncomfortable because of pain and standing  Goals  Plan Goals: Patient independent with HEP in 2 weeks  Tolerate 10 min Nustep in 2 weeks  Able to driving without increased pain in 2 weeks  Able to decrease pain and radicular symptoms 25% in 2 weeks  Able to decrease pain 50% in 12 weeks        Plan  Therapy options: will be seen for skilled therapy services  Planned modality interventions: thermotherapy (hydrocollator packs), traction and dry needling  Other planned modality interventions: physical modalities  as needed  Planned therapy interventions: manual therapy, strengthening, stretching, therapeutic activities, postural training, neuromuscular re-education, flexibility, functional ROM exercises, home exercise program and joint mobilization  Frequency: 2x week  Duration in weeks: 12  Treatment plan discussed with: patient        Timed:         Manual Therapy:         mins  25828;     Therapeutic Exercise:    15     mins  01879;     Neuromuscular Daly:        mins  26887;    Therapeutic Activity:          mins  37346;     Gait Training:           mins  23619;     Ultrasound:          mins  50239;    Self-care  ____ mins 04639    Un-Timed:  Electrical Stimulation:         mins  79082 ( );  Dry Needling          mins self-pay 20561  Traction     15     mins 18002  Low Eval      15    Mins  22534  Mod Eval          Mins  28318  Canal repositioning _____ mins  12908        Timed Treatment:   15   mins   Total Treatment:     45   mins    PT SIGNATURE: Robin A Sprigler, PT   [place] license # [id number]  Electronically signed by Robin A Sprigler, PT, 12/08/22, 4:37 PM EST    Initial Certification  Certification Period: 12/8/2022 through 3/7/2023  I certify that the therapy services are furnished while this patient is under my care.  The services outlined above are required by this patient, and will be reviewed every 90 days.     PHYSICIAN:  ______________________________________________________________________________________________     DATE: _________________________________________________________________________________________________________________________________    Please sign and return via fax to [phone]. Thank you, [facility].

## 2022-12-13 ENCOUNTER — OFFICE VISIT (OUTPATIENT)
Dept: FAMILY MEDICINE CLINIC | Facility: CLINIC | Age: 52
End: 2022-12-13

## 2022-12-13 VITALS
RESPIRATION RATE: 16 BRPM | DIASTOLIC BLOOD PRESSURE: 84 MMHG | SYSTOLIC BLOOD PRESSURE: 146 MMHG | HEIGHT: 71 IN | HEART RATE: 76 BPM | BODY MASS INDEX: 26.74 KG/M2 | WEIGHT: 191 LBS | OXYGEN SATURATION: 97 % | TEMPERATURE: 97.3 F

## 2022-12-13 DIAGNOSIS — Z12.11 SCREENING FOR COLON CANCER: ICD-10-CM

## 2022-12-13 DIAGNOSIS — R73.03 PRE-DIABETES: ICD-10-CM

## 2022-12-13 DIAGNOSIS — E78.49 OTHER HYPERLIPIDEMIA: ICD-10-CM

## 2022-12-13 DIAGNOSIS — Z00.00 ANNUAL PHYSICAL EXAM: Primary | ICD-10-CM

## 2022-12-13 DIAGNOSIS — I10 PRIMARY HYPERTENSION: ICD-10-CM

## 2022-12-13 DIAGNOSIS — F17.200 TOBACCO DEPENDENCE SYNDROME: ICD-10-CM

## 2022-12-13 DIAGNOSIS — M54.12 CERVICAL RADICULOPATHY: ICD-10-CM

## 2022-12-13 PROCEDURE — 99396 PREV VISIT EST AGE 40-64: CPT | Performed by: NURSE PRACTITIONER

## 2022-12-13 NOTE — PROGRESS NOTES
Chief Complaint  Annual Exam, Hypertension, and Hyperlipidemia    Subjective          Aditya Barnes presents to [facility] for annual exam, hypertension hyperlipidemia and neck pain.    History of Present Illness    Patient is here for an annual physical.  He is taking his hypertensive medicine.  He does need a recheck for his cholesterol today.  Trying to manage with diet.  He has no side effects or problems with medication    Patient has neck pain that is reoccurring starting last week.  It is mostly in the right trapezius and neck as well as radiation into the right upper arm.  He reports that he has aching in that arm.  He has had initial evaluation with therapy.  He is taking Mobic or ibuprofen.  He reports he has difficulty getting to sleep at night secondary to the pain.  He has no new injury.    Patient uses smokeless tobacco.        Aditya Barnes  has a past medical history of Hyperlipidemia, Hypertension, and Smokeless tobacco use.      Review of Systems   Constitutional: Negative for fatigue.   HENT: Negative for ear pain, sinus pain and sore throat.    Eyes: Negative for pain.   Respiratory: Negative for cough and shortness of breath.    Cardiovascular: Negative for chest pain.   Gastrointestinal: Negative for abdominal pain, diarrhea and nausea.   Endocrine: Negative for polyuria.   Genitourinary: Negative for decreased urine volume and dysuria.   Musculoskeletal: Positive for neck pain. Negative for arthralgias.   Skin: Negative for rash.   Allergic/Immunologic: Negative for environmental allergies.   Neurological: Negative for dizziness, numbness and headaches.   Hematological: Does not bruise/bleed easily.   Psychiatric/Behavioral: The patient is not nervous/anxious.         Objective       Current Outpatient Medications:   •  diclofenac (VOLTAREN) 75 MG EC tablet, , Disp: , Rfl:   •  lisinopril-hydrochlorothiazide (PRINZIDE,ZESTORETIC) 20-12.5 MG per tablet, Take 1  "tablet by mouth Every Morning., Disp: 90 tablet, Rfl: 3  •  meloxicam (MOBIC) 15 MG tablet, Daily., Disp: , Rfl:   •  metoprolol succinate XL (TOPROL-XL) 50 MG 24 hr tablet, Take 1 tablet by mouth Daily., Disp: 90 tablet, Rfl: 3    Vital Signs:      /84   Pulse 76   Temp 97.3 °F (36.3 °C) (Infrared)   Resp 16   Ht 179.1 cm (70.5\")   Wt 86.6 kg (191 lb)   SpO2 97%   BMI 27.02 kg/m²     Vitals:    12/13/22 0751 12/13/22 0756 12/13/22 0808   BP: 143/83 151/87 146/84   BP Location: Right arm Left arm    Patient Position: Sitting Sitting    Cuff Size: Small Adult Small Adult    Pulse: 76     Resp: 16     Temp: 97.3 °F (36.3 °C)     TempSrc: Infrared     SpO2: 97%     Weight: 86.6 kg (191 lb)     Height: 179.1 cm (70.5\")        Physical Exam  Vitals and nursing note reviewed.   Constitutional:       Appearance: Normal appearance. He is well-developed.   HENT:      Head: Normocephalic.      Right Ear: Tympanic membrane, ear canal and external ear normal.      Left Ear: Tympanic membrane, ear canal and external ear normal.      Nose: Nose normal.      Mouth/Throat:      Lips: Pink.      Mouth: Mucous membranes are moist.      Pharynx: Oropharynx is clear. Uvula midline.   Eyes:      General: Lids are normal. Vision grossly intact.      Conjunctiva/sclera: Conjunctivae normal.      Pupils: Pupils are equal, round, and reactive to light.   Neck:      Thyroid: No thyroid mass or thyromegaly.   Cardiovascular:      Rate and Rhythm: Regular rhythm.      Heart sounds: S1 normal and S2 normal. No murmur heard.    No friction rub. No gallop.   Pulmonary:      Effort: No respiratory distress.      Breath sounds: Normal breath sounds. No decreased breath sounds, wheezing or rales.   Chest:      Chest wall: No mass or tenderness.   Abdominal:      General: Bowel sounds are normal. There is no distension.      Palpations: Abdomen is soft.      Tenderness: There is no abdominal tenderness.      Hernia: No hernia is present. "   Musculoskeletal:         General: Normal range of motion.      Cervical back: Normal range of motion and neck supple.      Comments: C-spine tenderness in right trapezius with spasm.  Full slow range of motion.  DTRs 2+ and equal bilaterally.   Lymphadenopathy:      Cervical: No cervical adenopathy.   Skin:     General: Skin is warm and dry.   Neurological:      General: No focal deficit present.      Mental Status: He is alert and oriented to person, place, and time.      Sensory: No sensory deficit.      Motor: Motor function is intact.      Coordination: Coordination is intact.      Gait: Gait is intact.      Deep Tendon Reflexes: Reflexes are normal and symmetric.   Psychiatric:         Mood and Affect: Mood normal. Mood is not anxious or depressed.         Speech: Speech normal.         Behavior: Behavior normal.         Thought Content: Thought content normal.         Judgment: Judgment normal.        Result Review :                  PHQ-9 Total Score: 0           Assessment and Plan    Diagnoses and all orders for this visit:    1. Annual physical exam (Primary)  Comments:  Anticipatory guidance was done.  Orders:  -     Comprehensive Metabolic Panel  -     CBC & Differential  -     Lipid Panel With / Chol / HDL Ratio  -     Ambulatory Referral For Screening Colonoscopy  -     Hemoglobin A1c    2. Pre-diabetes  -     Hemoglobin A1c    3. Primary hypertension  Assessment & Plan:  Hypertension is worsening.  Continue current treatment regimen.  Blood pressure will be reassessed at the next regular appointment.  Rechecked blood pressure.  Elevation may be due to pain and anti-inflammatories.      4. Other hyperlipidemia  Assessment & Plan:  Rechecking fasting labs today.    Orders:  -     Comprehensive Metabolic Panel  -     CBC & Differential  -     Lipid Panel With / Chol / HDL Ratio    5. Tobacco dependence syndrome  Assessment & Plan:  Steady use of smokeless tobacco.      6. Screening for colon  cancer  Comments:  Highly encouraged  Orders:  -     Ambulatory Referral For Screening Colonoscopy    7. Cervical radiculopathy  Comments:  Continue anti-inflammatories as needed.  As needed muscle relaxer declined.  Continue therapy x4 to 6 weeks.  If not improving may need MRI  Orders:  -     Ambulatory Referral to Physical Therapy Evaluate and treat       Problem List Items Addressed This Visit        Active Problems    Hypertension    Current Assessment & Plan     Hypertension is worsening.  Continue current treatment regimen.  Blood pressure will be reassessed at the next regular appointment.  Rechecked blood pressure.  Elevation may be due to pain and anti-inflammatories.         Tobacco dependence syndrome    Current Assessment & Plan     Steady use of smokeless tobacco.         Hyperlipidemia    Current Assessment & Plan     Rechecking fasting labs today.         Relevant Orders    Comprehensive Metabolic Panel    CBC & Differential    Lipid Panel With / Chol / HDL Ratio   Other Visit Diagnoses     Annual physical exam    -  Primary    Anticipatory guidance was done.    Relevant Orders    Comprehensive Metabolic Panel    CBC & Differential    Lipid Panel With / Chol / HDL Ratio    Hemoglobin A1c    Pre-diabetes        Relevant Orders    Hemoglobin A1c    Screening for colon cancer        Highly encouraged    Cervical radiculopathy        Continue anti-inflammatories as needed.  As needed muscle relaxer declined.  Continue therapy x4 to 6 weeks.  If not improving may need MRI    Relevant Orders    Ambulatory Referral to Physical Therapy Evaluate and treat          Follow Up   Return in about 6 weeks (around 1/24/2023) for Recheck neck.  Patient was given instructions and counseling regarding his condition or for health maintenance advice. Please see specific information pulled into the AVS if appropriate.

## 2022-12-13 NOTE — ASSESSMENT & PLAN NOTE
Hypertension is worsening.  Continue current treatment regimen.  Blood pressure will be reassessed at the next regular appointment.  Rechecked blood pressure.  Elevation may be due to pain and anti-inflammatories.

## 2022-12-14 LAB
ALBUMIN SERPL-MCNC: 4.8 G/DL (ref 3.8–4.9)
ALBUMIN/GLOB SERPL: 2.2 {RATIO} (ref 1.2–2.2)
ALP SERPL-CCNC: 65 IU/L (ref 44–121)
ALT SERPL-CCNC: 26 IU/L (ref 0–44)
AST SERPL-CCNC: 18 IU/L (ref 0–40)
BASOPHILS # BLD AUTO: 0 X10E3/UL (ref 0–0.2)
BASOPHILS NFR BLD AUTO: 1 %
BILIRUB SERPL-MCNC: 0.5 MG/DL (ref 0–1.2)
BUN SERPL-MCNC: 18 MG/DL (ref 6–24)
BUN/CREAT SERPL: 15 (ref 9–20)
CALCIUM SERPL-MCNC: 9.4 MG/DL (ref 8.7–10.2)
CHLORIDE SERPL-SCNC: 105 MMOL/L (ref 96–106)
CHOLEST SERPL-MCNC: 173 MG/DL (ref 100–199)
CHOLEST/HDLC SERPL: 4.9 RATIO (ref 0–5)
CO2 SERPL-SCNC: 24 MMOL/L (ref 20–29)
CREAT SERPL-MCNC: 1.17 MG/DL (ref 0.76–1.27)
EGFRCR SERPLBLD CKD-EPI 2021: 75 ML/MIN/1.73
EOSINOPHIL # BLD AUTO: 0.1 X10E3/UL (ref 0–0.4)
EOSINOPHIL NFR BLD AUTO: 2 %
ERYTHROCYTE [DISTWIDTH] IN BLOOD BY AUTOMATED COUNT: 13 % (ref 11.6–15.4)
GLOBULIN SER CALC-MCNC: 2.2 G/DL (ref 1.5–4.5)
GLUCOSE SERPL-MCNC: 121 MG/DL (ref 70–99)
HBA1C MFR BLD: 5.7 % (ref 4.8–5.6)
HCT VFR BLD AUTO: 52.2 % (ref 37.5–51)
HDLC SERPL-MCNC: 35 MG/DL
HGB BLD-MCNC: 17.9 G/DL (ref 13–17.7)
IMM GRANULOCYTES # BLD AUTO: 0 X10E3/UL (ref 0–0.1)
IMM GRANULOCYTES NFR BLD AUTO: 1 %
LDLC SERPL CALC-MCNC: 109 MG/DL (ref 0–99)
LYMPHOCYTES # BLD AUTO: 1.8 X10E3/UL (ref 0.7–3.1)
LYMPHOCYTES NFR BLD AUTO: 32 %
MCH RBC QN AUTO: 30.4 PG (ref 26.6–33)
MCHC RBC AUTO-ENTMCNC: 34.3 G/DL (ref 31.5–35.7)
MCV RBC AUTO: 89 FL (ref 79–97)
MONOCYTES # BLD AUTO: 0.5 X10E3/UL (ref 0.1–0.9)
MONOCYTES NFR BLD AUTO: 10 %
NEUTROPHILS # BLD AUTO: 3 X10E3/UL (ref 1.4–7)
NEUTROPHILS NFR BLD AUTO: 54 %
PLATELET # BLD AUTO: 184 X10E3/UL (ref 150–450)
POTASSIUM SERPL-SCNC: 4.4 MMOL/L (ref 3.5–5.2)
PROT SERPL-MCNC: 7 G/DL (ref 6–8.5)
RBC # BLD AUTO: 5.89 X10E6/UL (ref 4.14–5.8)
SODIUM SERPL-SCNC: 144 MMOL/L (ref 134–144)
TRIGL SERPL-MCNC: 163 MG/DL (ref 0–149)
VLDLC SERPL CALC-MCNC: 29 MG/DL (ref 5–40)
WBC # BLD AUTO: 5.5 X10E3/UL (ref 3.4–10.8)

## 2022-12-16 NOTE — PROGRESS NOTES
Spoke with patient about labs, told patient Blood sugar was 121 andA1c was 5.7.  He is in the prediabetes range.  I would like for him to watch his diet.  Avoid sweet in soft drinks and tea, sweets and carbs.  If it continues to increase, I would consider him checking his blood sugar.       His chol numbers are numbers are mildly changes, but will improve with the above diet and exercise.       Most importantly, his red blood cells and hemoglobin are elevated.  The main cause of this is tobacco.  How much is he using snuff?  Can he quit or cut it and half and recheck CBC in tickler in 3 months?  It is important to recheck and determine if that improves these numbers.  He will also need his PSA done in that lab draw in the tickler.     Patient said he uses one can of snuff every 2 days.

## 2023-01-04 ENCOUNTER — TREATMENT (OUTPATIENT)
Dept: PHYSICAL THERAPY | Facility: CLINIC | Age: 53
End: 2023-01-04
Payer: COMMERCIAL

## 2023-01-04 DIAGNOSIS — M54.12 RADICULOPATHY, CERVICAL: ICD-10-CM

## 2023-01-04 DIAGNOSIS — M54.2 NECK PAIN ON RIGHT SIDE: Primary | ICD-10-CM

## 2023-01-04 PROCEDURE — 97110 THERAPEUTIC EXERCISES: CPT | Performed by: PHYSICAL THERAPIST

## 2023-01-04 PROCEDURE — 97012 MECHANICAL TRACTION THERAPY: CPT | Performed by: PHYSICAL THERAPIST

## 2023-01-04 PROCEDURE — 97140 MANUAL THERAPY 1/> REGIONS: CPT | Performed by: PHYSICAL THERAPIST

## 2023-01-04 NOTE — PROGRESS NOTES
Physical Therapy Daily Treatment Note      Patient: Aditya Barnes   : 1970  Diagnosis/ICD-10 Code:  Neck pain on right side [M54.2]   Problems Addressed this Visit    None  Visit Diagnoses     Neck pain on right side    -  Primary    Radiculopathy, cervical          Diagnoses       Codes Comments    Neck pain on right side    -  Primary ICD-10-CM: M54.2  ICD-9-CM: 723.1     Radiculopathy, cervical     ICD-10-CM: M54.12  ICD-9-CM: 723.4          Referring practitioner: No ref. provider found  Date of Initial Visit: Type: THERAPY  Noted: 2022  Today's Date: 2023    VISIT#: 2    Subjective Patient reports feeling better over the past couple of weeks with decreased pain and is feeling about 40- 50% better. Does still have occasional moments of pain in R side of neck with quick movements but overall better since last evaluation.       Objective progressed traction to 16#     See Exercise, Manual, and Modality Logs for complete treatment.     Assessment/Plan Patient with good response to manual interventions and cervical traction with decreased symptoms reported. Patient has progressed well with initial HEP active stretches/therapeutic exercise. Patient progressing well towards goals at this time.   Goals  Plan Goals: Patient independent with HEP in 2 weeks MET  Tolerate 10 min Nustep in 2 weeks    Able to driving without increased pain in 2 weeks MET  Able to decrease pain and radicular symptoms 25% in 2 weeks MET  Able to decrease pain 50% in 12 weeks    Progress per Plan of Care and Progress strengthening /stabilization /functional activity         Timed:         Manual Therapy:     15    mins  46795;     Therapeutic Exercise:    10     mins  33372;     Neuromuscular Daly:        mins  52386;    Therapeutic Activity:          mins  02952;     Gait Training:           mins  14301;     Ultrasound:          mins  28487;    Ionto                                   mins   27899  Self Care                     _____  mins   78602  Canalith Repos                   mins  07069    Un-Timed:  Electrical Stimulation:         mins  39898 ( );  Dry Needling          mins self-pay   Traction     15     mins 70807    Timed Treatment:   25   mins   Total Treatment:     40   mins    Nito Wayne PTA  [place] License [id number]  Physical Therapist Assistant

## 2023-01-08 RX ORDER — METOPROLOL SUCCINATE 50 MG/1
TABLET, EXTENDED RELEASE ORAL
Qty: 90 TABLET | Refills: 0 | Status: SHIPPED | OUTPATIENT
Start: 2023-01-08 | End: 2023-03-28 | Stop reason: SDUPTHER

## 2023-01-08 RX ORDER — LISINOPRIL AND HYDROCHLOROTHIAZIDE 20; 12.5 MG/1; MG/1
TABLET ORAL
Qty: 90 TABLET | Refills: 0 | Status: SHIPPED | OUTPATIENT
Start: 2023-01-08 | End: 2023-03-28 | Stop reason: SDUPTHER

## 2023-01-17 ENCOUNTER — OFFICE VISIT (OUTPATIENT)
Dept: FAMILY MEDICINE CLINIC | Facility: CLINIC | Age: 53
End: 2023-01-17
Payer: COMMERCIAL

## 2023-01-17 VITALS
BODY MASS INDEX: 26.6 KG/M2 | RESPIRATION RATE: 16 BRPM | HEART RATE: 75 BPM | HEIGHT: 71 IN | TEMPERATURE: 97.2 F | DIASTOLIC BLOOD PRESSURE: 78 MMHG | WEIGHT: 190 LBS | OXYGEN SATURATION: 98 % | SYSTOLIC BLOOD PRESSURE: 134 MMHG

## 2023-01-17 DIAGNOSIS — M54.12 CERVICAL RADICULOPATHY: ICD-10-CM

## 2023-01-17 DIAGNOSIS — R73.01 ELEVATED FASTING BLOOD SUGAR: ICD-10-CM

## 2023-01-17 DIAGNOSIS — Z12.11 SCREEN FOR COLON CANCER: ICD-10-CM

## 2023-01-17 DIAGNOSIS — Z12.5 SCREENING PSA (PROSTATE SPECIFIC ANTIGEN): ICD-10-CM

## 2023-01-17 DIAGNOSIS — F17.200 TOBACCO DEPENDENCE SYNDROME: ICD-10-CM

## 2023-01-17 DIAGNOSIS — E66.3 OVERWEIGHT (BMI 25.0-29.9): ICD-10-CM

## 2023-01-17 DIAGNOSIS — E78.49 OTHER HYPERLIPIDEMIA: ICD-10-CM

## 2023-01-17 DIAGNOSIS — I10 PRIMARY HYPERTENSION: Primary | ICD-10-CM

## 2023-01-17 PROCEDURE — 99214 OFFICE O/P EST MOD 30 MIN: CPT | Performed by: NURSE PRACTITIONER

## 2023-01-17 NOTE — PROGRESS NOTES
Answers for HPI/ROS submitted by the patient on 1/17/2023  What is the primary reason for your visit?: High Blood Pressure  Chronicity: chronic  Onset: more than 1 year ago  Progression since onset: unchanged  Condition status: controlled  anxiety: No  blurred vision: No  chest pain: No  headaches: No  malaise/fatigue: No  neck pain: No  orthopnea: No  palpitations: No  peripheral edema: No  PND: No  shortness of breath: No  sweats: No  Agents associated with hypertension: no associated agents  CAD risks: smoking/tobacco exposure  Compliance problems: diet    Chief Complaint  Neck Pain    Subjective          Aditya Barnes presents to [facility] for follow-up neck pain, hypertension    Hypertension  This is a chronic problem. The current episode started more than 1 year ago. The problem is unchanged. The problem is controlled. Pertinent negatives include no anxiety, blurred vision, chest pain, headaches, malaise/fatigue, neck pain, orthopnea, palpitations, peripheral edema, PND, shortness of breath or sweats. There are no associated agents to hypertension. Risk factors for coronary artery disease include smoking/tobacco exposure. Compliance problems include diet.        Patient is here to follow-up on neck pain.  He is starting physical therapy and reports that within 2 sessions he felt better.  He has since stopped physical therapy.    Patient has some elevation of his blood pressure into the 140 range.  He does use smokeless tobacco he is upset that his current medications are not taking care of his blood pressure.  He did have a history of a mild elevation in fasting sugar last night was here.  His A1c put him into the prediabetes range.  We have discussed that today again.  Aditya Barnes  has a past medical history of Hyperlipidemia, Hypertension, and Smokeless tobacco use.      Review of Systems   Constitutional: Negative for malaise/fatigue.   Eyes: Negative for blurred vision.  "  Respiratory: Negative for shortness of breath.    Cardiovascular: Negative for chest pain, palpitations, orthopnea and PND.   Musculoskeletal: Negative for neck pain.   Neurological: Negative for headaches.        Objective       Current Outpatient Medications:   •  lisinopril-hydrochlorothiazide (PRINZIDE,ZESTORETIC) 20-12.5 MG per tablet, TAKE 1 TABLET BY MOUTH ONCE DAILY IN THE MORNING, Disp: 90 tablet, Rfl: 0  •  metoprolol succinate XL (TOPROL-XL) 50 MG 24 hr tablet, Take 1 tablet by mouth once daily, Disp: 90 tablet, Rfl: 0    Vital Signs:      /78 (BP Location: Left arm)   Pulse 75   Temp 97.2 °F (36.2 °C) (Infrared)   Resp 16   Ht 179.1 cm (70.5\")   Wt 86.2 kg (190 lb)   SpO2 98%   BMI 26.88 kg/m²     Vitals:    01/17/23 0950 01/17/23 0953 01/17/23 1002 01/17/23 1003   BP: 148/89 147/88 132/78 134/78   BP Location: Left arm Right arm Right arm Left arm   Patient Position: Sitting Sitting     Cuff Size: Small Adult Small Adult     Pulse: 75      Resp: 16      Temp: 97.2 °F (36.2 °C)      TempSrc: Infrared      SpO2: 98%      Weight: 86.2 kg (190 lb)      Height: 179.1 cm (70.5\")         Physical Exam  Vitals and nursing note reviewed.   Constitutional:       Appearance: He is well-developed.   Cardiovascular:      Rate and Rhythm: Normal rate and regular rhythm.      Heart sounds: Normal heart sounds. No murmur heard.    No friction rub. No gallop.   Pulmonary:      Effort: Pulmonary effort is normal.      Breath sounds: Normal breath sounds. No wheezing or rales.   Skin:     General: Skin is warm and dry.   Neurological:      Mental Status: He is alert.        Result Review :                  PHQ-9 Total Score:             Assessment and Plan    Diagnoses and all orders for this visit:    1. Primary hypertension (Primary)  Assessment & Plan:  Hypertension is improving with treatment.  Continue current treatment regimen.  Ambulatory blood pressure monitoring.  Discussed stopping tobacco  Blood " pressure will be reassessed at the next regular appointment.    Orders:  -     Comprehensive Metabolic Panel; Future    2. Screen for colon cancer  Comments:  Has paperwork and will complete and get set up    3. BMI 26.0-26.9,adult    4. Tobacco dependence syndrome    5. Screening PSA (prostate specific antigen)  -     PSA Screen; Future    6. Other hyperlipidemia    7. Elevated fasting blood sugar  -     Hemoglobin A1c; Future    8. Overweight (BMI 25.0-29.9)  Assessment & Plan:  Patient's (Body mass index is 26.88 kg/m².) indicates that they are overweight with health conditions that include hypertension . Weight is unchanged. BMI is is above average; BMI management plan is completed. We discussed portion control and increasing exercise.       9. Cervical radiculopathy  Comments:  Resolved after therapy       Problem List Items Addressed This Visit        Active Problems    Hypertension - Primary    Current Assessment & Plan     Hypertension is improving with treatment.  Continue current treatment regimen.  Ambulatory blood pressure monitoring.  Discussed stopping tobacco  Blood pressure will be reassessed at the next regular appointment.         Relevant Orders    Comprehensive Metabolic Panel    Tobacco dependence syndrome    Hyperlipidemia    Screening PSA (prostate specific antigen)    Relevant Orders    PSA Screen    BMI 26.0-26.9,adult    Overweight (BMI 25.0-29.9)    Current Assessment & Plan     Patient's (Body mass index is 26.88 kg/m².) indicates that they are overweight with health conditions that include hypertension . Weight is unchanged. BMI is is above average; BMI management plan is completed. We discussed portion control and increasing exercise.         Other Visit Diagnoses     Screen for colon cancer        Has paperwork and will complete and get set up    Elevated fasting blood sugar        Relevant Orders    Hemoglobin A1c    Cervical radiculopathy        Resolved after therapy          Follow  Up   Return in about 6 months (around 7/17/2023) for Recheck htn, prediabetes with labs one week prior.  Patient was given instructions and counseling regarding his condition or for health maintenance advice. Please see specific information pulled into the AVS if appropriate.

## 2023-01-17 NOTE — ASSESSMENT & PLAN NOTE
Hypertension is improving with treatment.  Continue current treatment regimen.  Ambulatory blood pressure monitoring.  Discussed stopping tobacco  Blood pressure will be reassessed at the next regular appointment.

## 2023-01-17 NOTE — ASSESSMENT & PLAN NOTE
Patient's (Body mass index is 26.88 kg/m².) indicates that they are overweight with health conditions that include hypertension . Weight is unchanged. BMI is is above average; BMI management plan is completed. We discussed portion control and increasing exercise.

## 2023-02-16 ENCOUNTER — DOCUMENTATION (OUTPATIENT)
Dept: PHYSICAL THERAPY | Facility: CLINIC | Age: 53
End: 2023-02-16
Payer: COMMERCIAL

## 2023-02-16 DIAGNOSIS — M54.12 RADICULOPATHY, CERVICAL: ICD-10-CM

## 2023-02-16 DIAGNOSIS — M54.2 NECK PAIN ON RIGHT SIDE: Primary | ICD-10-CM

## 2023-02-16 NOTE — PROGRESS NOTES
Discharge Summary  Discharge Summary from Physical Therapy Report      Goals: Partially Met    Discharge Plan: Continue with current home exercise program as instructed    Comments Patient failed to return for treatment.  Last seen 1/4/2023     VISIT#: 2     Subjective Patient reports feeling better over the past couple of weeks with decreased pain and is feeling about 40- 50% better. Does still have occasional moments of pain in R side of neck with quick movements but overall better since last evaluation.         Objective progressed traction to 16#      See Exercise, Manual, and Modality Logs for complete treatment.      Assessment/Plan Patient with good response to manual interventions and cervical traction with decreased symptoms reported. Patient has progressed well with initial HEP active stretches/therapeutic exercise. Patient progressing well towards goals at this time.   Goals  Plan Goals: Patient independent with HEP in 2 weeks MET  Tolerate 10 min Nustep in 2 weeks    Able to driving without increased pain in 2 weeks MET  Able to decrease pain and radicular symptoms 25% in 2 weeks MET  Able to decrease pain 50% in 12 weeks    Date of Discharge 2/16/23        Robin A Sprigler, PT  Physical Therapist

## 2023-03-13 ENCOUNTER — TELEPHONE (OUTPATIENT)
Dept: FAMILY MEDICINE CLINIC | Facility: CLINIC | Age: 53
End: 2023-03-13
Payer: COMMERCIAL

## 2023-03-26 ENCOUNTER — TELEPHONE (OUTPATIENT)
Dept: FAMILY MEDICINE CLINIC | Facility: CLINIC | Age: 53
End: 2023-03-26
Payer: COMMERCIAL

## 2023-03-26 DIAGNOSIS — R71.8 ELEVATED HEMATOCRIT: Primary | ICD-10-CM

## 2023-03-26 DIAGNOSIS — R73.09 ELEVATED HEMOGLOBIN A1C: ICD-10-CM

## 2023-03-26 NOTE — TELEPHONE ENCOUNTER
Pt should have had a repeat CBC in the tickler with his labs from last week.  It was not done.  I recommended 3 months ago, because of elevation in H and H. The orders are in.

## 2023-03-28 DIAGNOSIS — D58.2 ELEVATED HEMOGLOBIN: ICD-10-CM

## 2023-03-28 DIAGNOSIS — R71.8 ELEVATED HEMATOCRIT: Primary | ICD-10-CM

## 2023-03-28 RX ORDER — LISINOPRIL AND HYDROCHLOROTHIAZIDE 20; 12.5 MG/1; MG/1
1 TABLET ORAL EVERY MORNING
Qty: 90 TABLET | Refills: 0 | Status: SHIPPED | OUTPATIENT
Start: 2023-03-28

## 2023-03-28 RX ORDER — METOPROLOL SUCCINATE 50 MG/1
50 TABLET, EXTENDED RELEASE ORAL DAILY
Qty: 90 TABLET | Refills: 0 | Status: SHIPPED | OUTPATIENT
Start: 2023-03-28

## 2023-07-26 ENCOUNTER — OFFICE VISIT (OUTPATIENT)
Dept: FAMILY MEDICINE CLINIC | Facility: CLINIC | Age: 53
End: 2023-07-26
Payer: COMMERCIAL

## 2023-07-26 VITALS
WEIGHT: 193 LBS | DIASTOLIC BLOOD PRESSURE: 85 MMHG | HEART RATE: 81 BPM | TEMPERATURE: 97.4 F | BODY MASS INDEX: 27.02 KG/M2 | RESPIRATION RATE: 16 BRPM | OXYGEN SATURATION: 95 % | HEIGHT: 71 IN | SYSTOLIC BLOOD PRESSURE: 132 MMHG

## 2023-07-26 DIAGNOSIS — R73.01 ELEVATED FASTING BLOOD SUGAR: ICD-10-CM

## 2023-07-26 DIAGNOSIS — R71.8 ELEVATED HEMATOCRIT: ICD-10-CM

## 2023-07-26 DIAGNOSIS — Z12.11 SCREEN FOR COLON CANCER: ICD-10-CM

## 2023-07-26 DIAGNOSIS — I10 PRIMARY HYPERTENSION: Primary | ICD-10-CM

## 2023-07-26 DIAGNOSIS — K42.9 UMBILICAL HERNIA WITHOUT OBSTRUCTION AND WITHOUT GANGRENE: ICD-10-CM

## 2023-07-26 DIAGNOSIS — D58.2 ELEVATED HEMOGLOBIN: ICD-10-CM

## 2023-07-26 DIAGNOSIS — M67.40 GANGLION CYST: ICD-10-CM

## 2023-07-26 DIAGNOSIS — E66.3 OVERWEIGHT (BMI 25.0-29.9): ICD-10-CM

## 2023-07-26 DIAGNOSIS — F17.200 TOBACCO DEPENDENCE SYNDROME: ICD-10-CM

## 2023-07-26 PROCEDURE — 99213 OFFICE O/P EST LOW 20 MIN: CPT | Performed by: NURSE PRACTITIONER

## 2023-07-26 NOTE — ASSESSMENT & PLAN NOTE
Tobacco use is unchanged.  Encouraged to quit  Tobacco use will be reassessed at the next regular appointment.

## 2023-07-26 NOTE — PROGRESS NOTES
Answers submitted by the patient for this visit:  Primary Reason for Visit (Submitted on 7/26/2023)  What is the primary reason for your visit?: High Blood Pressure  Chief Complaint  Hypertension and Hyperlipidemia    Subjective          Aditya Barnes presents to [facility] for hypertension, elevated H&H and prediabetes.    Hypertension  This is a chronic problem. The current episode started more than 1 year ago. The problem is unchanged. The problem is controlled. Pertinent negatives include no anxiety, blurred vision, chest pain, headaches, malaise/fatigue, neck pain, orthopnea, palpitations, peripheral edema, PND, shortness of breath or sweats. There are no associated agents to hypertension. Risk factors for coronary artery disease include smoking/tobacco exposure and stress. There are no compliance problems.      Patient is here to follow-up on hypertension and elevated H&H.  He is compliant with his medications and has no side effects.  He does use smokeless tobacco about a can every 2 days.  He had elevation in H&H back in December.  We discussed the RBCs we will elevated as well.  Encouraged to decrease smokeless tobacco.  Discussed the issues today as well as offered colonoscopy to be done and it was declined.    Patient has a hernia in his umbilical area that was previously identified.  He has not had any worsening in symptoms occasionally has some soreness for about an hour they are once or twice a year.  Would like to discuss long-term plan with hernia.    Patient has a nodule on his wrist and would like to discuss that as well.  Overall he feels good.  Aditya Barnes  has a past medical history of Hyperlipidemia, Hypertension, and Smokeless tobacco use.      Review of Systems   Constitutional:  Negative for malaise/fatigue.   Eyes:  Negative for blurred vision.   Respiratory:  Negative for shortness of breath.    Cardiovascular:  Negative for chest pain, palpitations,  "orthopnea and PND.   Gastrointestinal:         Umbilical hernia   Musculoskeletal:  Negative for neck pain.        Ganglion cyst   Neurological:  Negative for headaches.      Objective       Current Outpatient Medications:     lisinopril-hydrochlorothiazide (PRINZIDE,ZESTORETIC) 20-12.5 MG per tablet, Take 1 tablet by mouth Every Morning., Disp: 90 tablet, Rfl: 0    metoprolol succinate XL (TOPROL-XL) 50 MG 24 hr tablet, Take 1 tablet by mouth Daily., Disp: 90 tablet, Rfl: 0    Vital Signs:      /85 (BP Location: Right arm, Patient Position: Sitting, Cuff Size: Small Adult)   Pulse 81   Temp 97.4 °F (36.3 °C) (Infrared)   Resp 16   Ht 179.1 cm (70.5\")   Wt 87.5 kg (193 lb)   SpO2 95%   BMI 27.30 kg/m²     Vitals:    07/26/23 1027 07/26/23 1030   BP: 141/83 132/85   BP Location: Left arm Right arm   Patient Position: Sitting Sitting   Cuff Size: Small Adult Small Adult   Pulse: 81    Resp: 16    Temp: 97.4 °F (36.3 °C)    TempSrc: Infrared    SpO2: 95%    Weight: 87.5 kg (193 lb)    Height: 179.1 cm (70.5\")       Physical Exam  Vitals and nursing note reviewed.   Constitutional:       Appearance: He is well-developed.   Cardiovascular:      Rate and Rhythm: Normal rate and regular rhythm.      Heart sounds: Normal heart sounds. No murmur heard.    No friction rub. No gallop.   Pulmonary:      Effort: Pulmonary effort is normal.      Breath sounds: Normal breath sounds. No wheezing or rales.   Abdominal:      General: Bowel sounds are normal.      Palpations: Abdomen is soft.      Tenderness: There is no abdominal tenderness.      Comments: Soft easily reducible umbilical hernia   Musculoskeletal:      Comments: Right volar wrist with 1 cm superficial nodule   Skin:     General: Skin is warm and dry.   Neurological:      Mental Status: He is alert.      Result Review :                  PHQ-9 Total Score: 0           Assessment and Plan    Diagnoses and all orders for this visit:    1. Primary hypertension " (Primary)  Assessment & Plan:  Hypertension is improving with treatment.  Continue current treatment regimen.  Blood pressure will be reassessed at the next regular appointment.    Orders:  -     Comprehensive Metabolic Panel    2. BMI 27.0-27.9,adult    3. Tobacco dependence syndrome  Assessment & Plan:  Tobacco use is unchanged.  Encouraged to quit  Tobacco use will be reassessed at the next regular appointment.      4. Elevated hemoglobin  Comments:  Needs recheck.  Discussed role of smokeless tobacco.  Also discussed long-term effects of elevated H&H.  Orders:  -     CBC & Differential    5. Elevated hematocrit  -     CBC & Differential    6. Screen for colon cancer  Comments:  Declines  colonoscopy or Cologuard    7. Elevated fasting blood sugar    8. Overweight (BMI 25.0-29.9)  Assessment & Plan:  Patient's (Body mass index is 27.3 kg/m².) indicates that they are overweight with health conditions that include hypertension . Weight is worsening. BMI is is above average; BMI management plan is completed. We discussed portion control and increasing exercise.       9. Umbilical hernia without obstruction and without gangrene  Assessment & Plan:  Easily reducible.  Discussed options we will observe for now.      10. Ganglion cyst  Assessment & Plan:  If worsening will refer to hand.  Discussed ganglion cyst           Problem List Items Addressed This Visit          Active Problems    Hypertension - Primary    Current Assessment & Plan     Hypertension is improving with treatment.  Continue current treatment regimen.  Blood pressure will be reassessed at the next regular appointment.         Relevant Orders    Comprehensive Metabolic Panel    Tobacco dependence syndrome    Current Assessment & Plan     Tobacco use is unchanged.  Encouraged to quit  Tobacco use will be reassessed at the next regular appointment.         BMI 27.0-27.9,adult    Overweight (BMI 25.0-29.9)    Current Assessment & Plan     Patient's (Body  mass index is 27.3 kg/m².) indicates that they are overweight with health conditions that include hypertension . Weight is worsening. BMI is is above average; BMI management plan is completed. We discussed portion control and increasing exercise.          Ganglion cyst    Current Assessment & Plan     If worsening will refer to hand.  Discussed ganglion cyst         Umbilical hernia without obstruction and without gangrene    Current Assessment & Plan     Easily reducible.  Discussed options we will observe for now.          Other Visit Diagnoses       Elevated hemoglobin        Needs recheck.  Discussed role of smokeless tobacco.  Also discussed long-term effects of elevated H&H.    Relevant Orders    CBC & Differential    Elevated hematocrit        Relevant Orders    CBC & Differential    Screen for colon cancer        Declines  colonoscopy or Cologuard    Elevated fasting blood sugar                Follow Up   Return in about 6 months (around 1/26/2024) for Annual physical.  Patient was given instructions and counseling regarding his condition or for health maintenance advice. Please see specific information pulled into the AVS if appropriate.

## 2023-07-26 NOTE — ASSESSMENT & PLAN NOTE
Patient's (Body mass index is 27.3 kg/m².) indicates that they are overweight with health conditions that include hypertension . Weight is worsening. BMI is is above average; BMI management plan is completed. We discussed portion control and increasing exercise.

## 2023-10-11 RX ORDER — METOPROLOL SUCCINATE 50 MG/1
50 TABLET, EXTENDED RELEASE ORAL DAILY
Qty: 90 TABLET | Refills: 0 | Status: SHIPPED | OUTPATIENT
Start: 2023-10-11

## 2023-10-11 RX ORDER — LISINOPRIL AND HYDROCHLOROTHIAZIDE 20; 12.5 MG/1; MG/1
1 TABLET ORAL EVERY MORNING
Qty: 90 TABLET | Refills: 0 | Status: SHIPPED | OUTPATIENT
Start: 2023-10-11

## 2024-01-10 RX ORDER — METOPROLOL SUCCINATE 50 MG/1
50 TABLET, EXTENDED RELEASE ORAL DAILY
Qty: 90 TABLET | Refills: 0 | Status: SHIPPED | OUTPATIENT
Start: 2024-01-10

## 2024-01-10 RX ORDER — LISINOPRIL AND HYDROCHLOROTHIAZIDE 20; 12.5 MG/1; MG/1
1 TABLET ORAL EVERY MORNING
Qty: 90 TABLET | Refills: 0 | Status: SHIPPED | OUTPATIENT
Start: 2024-01-10

## 2024-01-29 ENCOUNTER — TELEPHONE (OUTPATIENT)
Dept: FAMILY MEDICINE CLINIC | Facility: CLINIC | Age: 54
End: 2024-01-29
Payer: COMMERCIAL

## 2024-01-29 DIAGNOSIS — R73.03 PRE-DIABETES: ICD-10-CM

## 2024-01-29 DIAGNOSIS — E78.49 OTHER HYPERLIPIDEMIA: ICD-10-CM

## 2024-01-29 DIAGNOSIS — I10 PRIMARY HYPERTENSION: Primary | ICD-10-CM

## 2024-01-29 NOTE — TELEPHONE ENCOUNTER
69 y.o M w/ PMH as noted above who presented to ED due to progressive dyspnea. Admitted to critical care with NSTEMI,cardiogenic shock. Urology has been consulted for evaluation of bilateral hydronephrosis noted on CT. Massively distended bladder was also noted on CT. Hydronephrosis likely related to distended bladder and will resolve bladder decompression. NAD noted on examination. FC in place draining clear yellow urine. Patient reports bilateral flank soreness.      General: NAD  Cardio: no distress  Res: Even, unlabored  : FC in place draining clear yellow urine, flank pain +      Plan:  - Maintain FC 7 - 10 days, VT per primary team. If patient fails VT please call [facility] to schedule OP VT. [phone]  - Renal US in 2 days to assess hydronephrosis    Plan discussed with Dr. Sonya Mcgarry Patient advised to get fasting lab work done at [facility] prior to being seen in office if able.

## 2024-01-31 ENCOUNTER — OFFICE VISIT (OUTPATIENT)
Dept: FAMILY MEDICINE CLINIC | Facility: CLINIC | Age: 54
End: 2024-01-31
Payer: COMMERCIAL

## 2024-01-31 VITALS
OXYGEN SATURATION: 96 % | DIASTOLIC BLOOD PRESSURE: 79 MMHG | BODY MASS INDEX: 27.44 KG/M2 | TEMPERATURE: 97.5 F | SYSTOLIC BLOOD PRESSURE: 135 MMHG | HEART RATE: 67 BPM | WEIGHT: 196 LBS | RESPIRATION RATE: 16 BRPM | HEIGHT: 71 IN

## 2024-01-31 DIAGNOSIS — E78.49 OTHER HYPERLIPIDEMIA: ICD-10-CM

## 2024-01-31 DIAGNOSIS — F17.200 TOBACCO DEPENDENCE SYNDROME: ICD-10-CM

## 2024-01-31 DIAGNOSIS — I10 PRIMARY HYPERTENSION: ICD-10-CM

## 2024-01-31 DIAGNOSIS — K42.9 UMBILICAL HERNIA WITHOUT OBSTRUCTION AND WITHOUT GANGRENE: ICD-10-CM

## 2024-01-31 DIAGNOSIS — E66.3 OVERWEIGHT (BMI 25.0-29.9): ICD-10-CM

## 2024-01-31 DIAGNOSIS — H61.23 BILATERAL IMPACTED CERUMEN: ICD-10-CM

## 2024-01-31 DIAGNOSIS — Z00.00 ANNUAL PHYSICAL EXAM: Primary | ICD-10-CM

## 2024-01-31 PROCEDURE — 99396 PREV VISIT EST AGE 40-64: CPT | Performed by: NURSE PRACTITIONER

## 2024-01-31 NOTE — PROGRESS NOTES
"Chief Complaint  Annual Exam (Declines colonoscopy), Hypertension, and Hyperlipidemia    Subjective          Aditya Barnes presents to [facility] for annual exam, hypertension, hyperlipidemia    History of Present Illness    Patient is here for annual exam.  He is compliant with his blood pressure medication.  He is due for cholesterol as well as recheck on CBC and his A1c.  He has had some fasting sugars are elevated and A1c is 6 last time it was done.    Patient has an umbilical hernia that is being observed.  We discussed that again today.    Aditya Barnes  has a past medical history of Hyperlipidemia, Hypertension, and Smokeless tobacco use.      Review of Systems   Constitutional:  Negative for fatigue.   HENT:  Negative for ear pain, sinus pain and sore throat.    Eyes:  Negative for pain.   Respiratory:  Negative for cough and shortness of breath.    Cardiovascular:  Negative for chest pain.   Gastrointestinal:  Negative for abdominal pain, diarrhea and nausea.   Endocrine: Negative for polyuria.   Genitourinary:  Negative for decreased urine volume and dysuria.   Musculoskeletal:  Negative for arthralgias.   Skin:  Negative for rash.   Allergic/Immunologic: Negative for environmental allergies.   Neurological:  Negative for dizziness, numbness and headaches.   Hematological:  Does not bruise/bleed easily.   Psychiatric/Behavioral:  The patient is not nervous/anxious.         Objective       Current Outpatient Medications:     lisinopril-hydrochlorothiazide (PRINZIDE,ZESTORETIC) 20-12.5 MG per tablet, Take 1 tablet by mouth Every Morning., Disp: 90 tablet, Rfl: 0    metoprolol succinate XL (TOPROL-XL) 50 MG 24 hr tablet, Take 1 tablet by mouth Daily., Disp: 90 tablet, Rfl: 0    Vital Signs:      /79 (BP Location: Right arm, Patient Position: Sitting, Cuff Size: Small Adult)   Pulse 67   Temp 97.5 °F (36.4 °C) (Infrared)   Resp 16   Ht 179.1 cm (70.5\")   Wt 88.9 kg " "(196 lb)   SpO2 96%   BMI 27.73 kg/m²     Vitals:    01/31/24 0807   BP: 135/79   BP Location: Right arm   Patient Position: Sitting   Cuff Size: Small Adult   Pulse: 67   Resp: 16   Temp: 97.5 °F (36.4 °C)   TempSrc: Infrared   SpO2: 96%   Weight: 88.9 kg (196 lb)   Height: 179.1 cm (70.5\")      Physical Exam  Vitals and nursing note reviewed.   Constitutional:       Appearance: Normal appearance. He is well-developed.   HENT:      Head: Normocephalic.      Right Ear: Ear canal and external ear normal. There is impacted cerumen.      Left Ear: Tympanic membrane, ear canal and external ear normal. There is impacted cerumen.      Ears:      Comments: Bilateral irrigation.  Left ear completely cleared TM normal.  Partial clearing of right ear with impaction remaining.     Nose: Nose normal.      Mouth/Throat:      Lips: Pink.      Mouth: Mucous membranes are moist.      Pharynx: Oropharynx is clear. Uvula midline.   Eyes:      General: Lids are normal. Vision grossly intact.      Conjunctiva/sclera: Conjunctivae normal.      Pupils: Pupils are equal, round, and reactive to light.   Neck:      Thyroid: No thyroid mass or thyromegaly.   Cardiovascular:      Rate and Rhythm: Regular rhythm.      Heart sounds: S1 normal and S2 normal. No murmur heard.     No friction rub. No gallop.   Pulmonary:      Effort: No respiratory distress.      Breath sounds: Normal breath sounds. No decreased breath sounds, wheezing or rales.   Chest:      Chest wall: No mass or tenderness.   Abdominal:      General: Bowel sounds are normal. There is no distension.      Palpations: Abdomen is soft.      Tenderness: There is no abdominal tenderness.      Hernia: No hernia is present.   Musculoskeletal:         General: Normal range of motion.      Cervical back: Normal range of motion and neck supple.   Lymphadenopathy:      Cervical: No cervical adenopathy.   Skin:     General: Skin is warm and dry.   Neurological:      General: No focal " deficit present.      Mental Status: He is alert and oriented to person, place, and time.      Sensory: No sensory deficit.      Motor: Motor function is intact.      Coordination: Coordination is intact.      Gait: Gait is intact.      Deep Tendon Reflexes: Reflexes are normal and symmetric.   Psychiatric:         Mood and Affect: Mood normal. Mood is not anxious or depressed.         Speech: Speech normal.         Behavior: Behavior normal.         Thought Content: Thought content normal.         Judgment: Judgment normal.        Result Review :                  PHQ-9 Total Score: 0           Assessment and Plan    Diagnoses and all orders for this visit:    1. Annual physical exam (Primary)  Comments:  Anticipatory guidance was done    2. Other hyperlipidemia  Assessment & Plan:  Rechecking labs today      3. Primary hypertension  Assessment & Plan:  Hypertension is improving with treatment.  Continue current treatment regimen.  Blood pressure will be reassessed at the next regular appointment.      4. Overweight (BMI 25.0-29.9)  Assessment & Plan:  Patient's (Body mass index is 27.73 kg/m².) indicates that they are overweight with health conditions that include hypertension and dyslipidemias . Weight is unchanged. BMI is is above average; BMI management plan is completed. We discussed portion control and increasing exercise.       5. BMI 27.0-27.9,adult    6. Bilateral impacted cerumen  Comments:  Left ear cleared.  Continuing to have dry wax in right ear.  Will use Cerumenex and return if needed    7. Umbilical hernia without obstruction and without gangrene  Assessment & Plan:  Discussed we will follow-up if desires surgery      8. Tobacco dependence syndrome  Assessment & Plan:  Tobacco use is unchanged.  unchanged  Tobacco use will be reassessed at the next regular appointment.           Problem List Items Addressed This Visit          Active Problems    Hypertension    Current Assessment & Plan      Hypertension is improving with treatment.  Continue current treatment regimen.  Blood pressure will be reassessed at the next regular appointment.         Tobacco dependence syndrome    Current Assessment & Plan     Tobacco use is unchanged.  unchanged  Tobacco use will be reassessed at the next regular appointment.         Hyperlipidemia    Current Assessment & Plan     Rechecking labs today         BMI 27.0-27.9,adult    Overweight (BMI 25.0-29.9)    Current Assessment & Plan     Patient's (Body mass index is 27.73 kg/m².) indicates that they are overweight with health conditions that include hypertension and dyslipidemias . Weight is unchanged. BMI is is above average; BMI management plan is completed. We discussed portion control and increasing exercise.          Umbilical hernia without obstruction and without gangrene    Current Assessment & Plan     Discussed we will follow-up if desires surgery          Other Visit Diagnoses       Annual physical exam    -  Primary    Anticipatory guidance was done    Bilateral impacted cerumen        Left ear cleared.  Continuing to have dry wax in right ear.  Will use Cerumenex and return if needed            Follow Up   Return in about 6 months (around 7/31/2024) for Recheck prediabetes and HTN.  Patient was given instructions and counseling regarding his condition or for health maintenance advice. Please see specific information pulled into the AVS if appropriate.

## 2024-01-31 NOTE — ASSESSMENT & PLAN NOTE
Tobacco use is unchanged.  unchanged  Tobacco use will be reassessed at the next regular appointment.

## 2024-01-31 NOTE — ASSESSMENT & PLAN NOTE
Patient's (Body mass index is 27.73 kg/m².) indicates that they are overweight with health conditions that include hypertension and dyslipidemias . Weight is unchanged. BMI is is above average; BMI management plan is completed. We discussed portion control and increasing exercise.

## 2024-02-01 LAB
ALBUMIN SERPL-MCNC: 4.8 G/DL (ref 3.8–4.9)
ALBUMIN/GLOB SERPL: 2.1 {RATIO} (ref 1.2–2.2)
ALP SERPL-CCNC: 64 IU/L (ref 44–121)
ALT SERPL-CCNC: 31 IU/L (ref 0–44)
AST SERPL-CCNC: 19 IU/L (ref 0–40)
BASOPHILS # BLD AUTO: 0 X10E3/UL (ref 0–0.2)
BASOPHILS NFR BLD AUTO: 0 %
BILIRUB SERPL-MCNC: 0.4 MG/DL (ref 0–1.2)
BUN SERPL-MCNC: 16 MG/DL (ref 6–24)
BUN/CREAT SERPL: 14 (ref 9–20)
CALCIUM SERPL-MCNC: 9.4 MG/DL (ref 8.7–10.2)
CHLORIDE SERPL-SCNC: 104 MMOL/L (ref 96–106)
CHOLEST SERPL-MCNC: 163 MG/DL (ref 100–199)
CHOLEST/HDLC SERPL: 4.5 RATIO (ref 0–5)
CO2 SERPL-SCNC: 23 MMOL/L (ref 20–29)
CREAT SERPL-MCNC: 1.16 MG/DL (ref 0.76–1.27)
EGFRCR SERPLBLD CKD-EPI 2021: 75 ML/MIN/1.73
EOSINOPHIL # BLD AUTO: 0.1 X10E3/UL (ref 0–0.4)
EOSINOPHIL NFR BLD AUTO: 2 %
ERYTHROCYTE [DISTWIDTH] IN BLOOD BY AUTOMATED COUNT: 12.9 % (ref 11.6–15.4)
GLOBULIN SER CALC-MCNC: 2.3 G/DL (ref 1.5–4.5)
GLUCOSE SERPL-MCNC: 110 MG/DL (ref 70–99)
HBA1C MFR BLD: 5.9 % (ref 4.8–5.6)
HCT VFR BLD AUTO: 51.2 % (ref 37.5–51)
HDLC SERPL-MCNC: 36 MG/DL
HGB BLD-MCNC: 17.5 G/DL (ref 13–17.7)
IMM GRANULOCYTES # BLD AUTO: 0 X10E3/UL (ref 0–0.1)
IMM GRANULOCYTES NFR BLD AUTO: 1 %
LDLC SERPL CALC-MCNC: 106 MG/DL (ref 0–99)
LYMPHOCYTES # BLD AUTO: 1.8 X10E3/UL (ref 0.7–3.1)
LYMPHOCYTES NFR BLD AUTO: 30 %
MCH RBC QN AUTO: 30 PG (ref 26.6–33)
MCHC RBC AUTO-ENTMCNC: 34.2 G/DL (ref 31.5–35.7)
MCV RBC AUTO: 88 FL (ref 79–97)
MONOCYTES # BLD AUTO: 0.5 X10E3/UL (ref 0.1–0.9)
MONOCYTES NFR BLD AUTO: 8 %
NEUTROPHILS # BLD AUTO: 3.5 X10E3/UL (ref 1.4–7)
NEUTROPHILS NFR BLD AUTO: 59 %
PLATELET # BLD AUTO: 185 X10E3/UL (ref 150–450)
POTASSIUM SERPL-SCNC: 4.4 MMOL/L (ref 3.5–5.2)
PROT SERPL-MCNC: 7.1 G/DL (ref 6–8.5)
RBC # BLD AUTO: 5.83 X10E6/UL (ref 4.14–5.8)
SODIUM SERPL-SCNC: 143 MMOL/L (ref 134–144)
TRIGL SERPL-MCNC: 113 MG/DL (ref 0–149)
VLDLC SERPL CALC-MCNC: 21 MG/DL (ref 5–40)
WBC # BLD AUTO: 5.9 X10E3/UL (ref 3.4–10.8)

## 2024-04-11 RX ORDER — METOPROLOL SUCCINATE 50 MG/1
50 TABLET, EXTENDED RELEASE ORAL DAILY
Qty: 90 TABLET | Refills: 1 | Status: SHIPPED | OUTPATIENT
Start: 2024-04-11

## 2024-04-11 RX ORDER — LISINOPRIL AND HYDROCHLOROTHIAZIDE 20; 12.5 MG/1; MG/1
1 TABLET ORAL EVERY MORNING
Qty: 90 TABLET | Refills: 1 | Status: SHIPPED | OUTPATIENT
Start: 2024-04-11

## 2024-07-23 ENCOUNTER — TELEPHONE (OUTPATIENT)
Dept: FAMILY MEDICINE CLINIC | Facility: CLINIC | Age: 54
End: 2024-07-23
Payer: COMMERCIAL

## 2024-07-23 DIAGNOSIS — I10 PRIMARY HYPERTENSION: ICD-10-CM

## 2024-07-23 DIAGNOSIS — R73.03 PRE-DIABETES: Primary | ICD-10-CM

## 2024-07-23 NOTE — TELEPHONE ENCOUNTER
Patient advised to get fasting lab work done at [facility] at least 3 days prior to being seen in office if able.

## 2024-08-28 LAB
ALBUMIN SERPL-MCNC: 4.4 G/DL (ref 3.8–4.9)
ALP SERPL-CCNC: 62 IU/L (ref 44–121)
ALT SERPL-CCNC: 23 IU/L (ref 0–44)
AST SERPL-CCNC: 17 IU/L (ref 0–40)
BASOPHILS # BLD AUTO: 0 X10E3/UL (ref 0–0.2)
BASOPHILS NFR BLD AUTO: 1 %
BILIRUB SERPL-MCNC: 0.3 MG/DL (ref 0–1.2)
BUN SERPL-MCNC: 19 MG/DL (ref 6–24)
BUN/CREAT SERPL: 17 (ref 9–20)
CALCIUM SERPL-MCNC: 9.4 MG/DL (ref 8.7–10.2)
CHLORIDE SERPL-SCNC: 105 MMOL/L (ref 96–106)
CO2 SERPL-SCNC: 21 MMOL/L (ref 20–29)
CREAT SERPL-MCNC: 1.15 MG/DL (ref 0.76–1.27)
EGFRCR SERPLBLD CKD-EPI 2021: 75 ML/MIN/1.73
EOSINOPHIL # BLD AUTO: 0.1 X10E3/UL (ref 0–0.4)
EOSINOPHIL NFR BLD AUTO: 3 %
ERYTHROCYTE [DISTWIDTH] IN BLOOD BY AUTOMATED COUNT: 13.1 % (ref 11.6–15.4)
GLOBULIN SER CALC-MCNC: 2.5 G/DL (ref 1.5–4.5)
GLUCOSE SERPL-MCNC: NORMAL MG/DL
HBA1C MFR BLD: 6 % (ref 4.8–5.6)
HCT VFR BLD AUTO: 53.2 % (ref 37.5–51)
HGB BLD-MCNC: 17.4 G/DL (ref 13–17.7)
IMM GRANULOCYTES # BLD AUTO: 0 X10E3/UL (ref 0–0.1)
IMM GRANULOCYTES NFR BLD AUTO: 0 %
LYMPHOCYTES # BLD AUTO: 2.2 X10E3/UL (ref 0.7–3.1)
LYMPHOCYTES NFR BLD AUTO: 39 %
MCH RBC QN AUTO: 29.9 PG (ref 26.6–33)
MCHC RBC AUTO-ENTMCNC: 32.7 G/DL (ref 31.5–35.7)
MCV RBC AUTO: 92 FL (ref 79–97)
MONOCYTES # BLD AUTO: 0.5 X10E3/UL (ref 0.1–0.9)
MONOCYTES NFR BLD AUTO: 9 %
NEUTROPHILS # BLD AUTO: 2.8 X10E3/UL (ref 1.4–7)
NEUTROPHILS NFR BLD AUTO: 48 %
PLATELET # BLD AUTO: 186 X10E3/UL (ref 150–450)
POTASSIUM SERPL-SCNC: NORMAL MMOL/L
PROT SERPL-MCNC: 6.9 G/DL (ref 6–8.5)
RBC # BLD AUTO: 5.81 X10E6/UL (ref 4.14–5.8)
SODIUM SERPL-SCNC: 143 MMOL/L (ref 134–144)
WBC # BLD AUTO: 5.7 X10E3/UL (ref 3.4–10.8)

## 2024-09-18 ENCOUNTER — OFFICE VISIT (OUTPATIENT)
Dept: FAMILY MEDICINE CLINIC | Facility: CLINIC | Age: 54
End: 2024-09-18
Payer: COMMERCIAL

## 2024-09-18 VITALS
HEART RATE: 70 BPM | WEIGHT: 189 LBS | BODY MASS INDEX: 26.46 KG/M2 | DIASTOLIC BLOOD PRESSURE: 85 MMHG | HEIGHT: 71 IN | RESPIRATION RATE: 16 BRPM | TEMPERATURE: 97.7 F | SYSTOLIC BLOOD PRESSURE: 128 MMHG | OXYGEN SATURATION: 96 %

## 2024-09-18 DIAGNOSIS — F17.200 TOBACCO DEPENDENCE SYNDROME: ICD-10-CM

## 2024-09-18 DIAGNOSIS — I10 PRIMARY HYPERTENSION: Primary | ICD-10-CM

## 2024-09-18 DIAGNOSIS — R73.03 PRE-DIABETES: ICD-10-CM

## 2024-09-18 DIAGNOSIS — R79.89 ABNORMAL CBC MEASUREMENT: ICD-10-CM

## 2024-09-18 DIAGNOSIS — E66.3 OVERWEIGHT (BMI 25.0-29.9): ICD-10-CM

## 2024-09-18 PROCEDURE — 99214 OFFICE O/P EST MOD 30 MIN: CPT | Performed by: NURSE PRACTITIONER

## 2024-09-18 RX ORDER — LISINOPRIL AND HYDROCHLOROTHIAZIDE 12.5; 2 MG/1; MG/1
1 TABLET ORAL EVERY MORNING
Qty: 90 TABLET | Refills: 1 | Status: SHIPPED | OUTPATIENT
Start: 2024-09-18

## 2024-09-18 RX ORDER — METOPROLOL SUCCINATE 50 MG/1
50 TABLET, EXTENDED RELEASE ORAL DAILY
Qty: 90 TABLET | Refills: 1 | Status: SHIPPED | OUTPATIENT
Start: 2024-09-18

## 2025-03-12 ENCOUNTER — TELEPHONE (OUTPATIENT)
Dept: FAMILY MEDICINE CLINIC | Facility: CLINIC | Age: 55
End: 2025-03-12
Payer: COMMERCIAL

## 2025-03-12 DIAGNOSIS — Z12.5 ENCOUNTER FOR SCREENING PROSTATE SPECIFIC ANTIGEN (PSA) MEASUREMENT: ICD-10-CM

## 2025-03-12 DIAGNOSIS — R71.8 ELEVATED HEMATOCRIT: ICD-10-CM

## 2025-03-12 DIAGNOSIS — D58.2 ELEVATED HEMOGLOBIN: ICD-10-CM

## 2025-03-12 DIAGNOSIS — R73.03 PRE-DIABETES: Primary | ICD-10-CM

## 2025-03-12 DIAGNOSIS — Z00.00 ANNUAL PHYSICAL EXAM: ICD-10-CM

## 2025-03-12 DIAGNOSIS — E78.49 OTHER HYPERLIPIDEMIA: ICD-10-CM

## 2025-03-12 DIAGNOSIS — R79.89 ABNORMAL CBC MEASUREMENT: ICD-10-CM

## 2025-03-12 DIAGNOSIS — I10 PRIMARY HYPERTENSION: ICD-10-CM

## 2025-03-12 NOTE — TELEPHONE ENCOUNTER
Patient advised to get fasting lab work done at [facility] at least 2 days prior to being seen in office if able.

## 2025-03-25 ENCOUNTER — OFFICE VISIT (OUTPATIENT)
Dept: FAMILY MEDICINE CLINIC | Facility: CLINIC | Age: 55
End: 2025-03-25
Payer: COMMERCIAL

## 2025-03-25 ENCOUNTER — TELEPHONE (OUTPATIENT)
Dept: FAMILY MEDICINE CLINIC | Facility: CLINIC | Age: 55
End: 2025-03-25

## 2025-03-25 VITALS
DIASTOLIC BLOOD PRESSURE: 82 MMHG | HEART RATE: 78 BPM | HEIGHT: 71 IN | SYSTOLIC BLOOD PRESSURE: 137 MMHG | TEMPERATURE: 97.5 F | OXYGEN SATURATION: 95 % | RESPIRATION RATE: 16 BRPM | BODY MASS INDEX: 26.74 KG/M2 | WEIGHT: 191 LBS

## 2025-03-25 DIAGNOSIS — Z12.11 SCREENING FOR COLON CANCER: ICD-10-CM

## 2025-03-25 DIAGNOSIS — D58.2 ELEVATED HEMOGLOBIN: ICD-10-CM

## 2025-03-25 DIAGNOSIS — Z00.00 ANNUAL PHYSICAL EXAM: Primary | ICD-10-CM

## 2025-03-25 DIAGNOSIS — E66.3 OVERWEIGHT (BMI 25.0-29.9): ICD-10-CM

## 2025-03-25 DIAGNOSIS — I10 PRIMARY HYPERTENSION: ICD-10-CM

## 2025-03-25 DIAGNOSIS — R79.89 ABNORMAL CBC MEASUREMENT: ICD-10-CM

## 2025-03-25 DIAGNOSIS — K42.9 UMBILICAL HERNIA WITHOUT OBSTRUCTION AND WITHOUT GANGRENE: ICD-10-CM

## 2025-03-25 DIAGNOSIS — R73.03 PRE-DIABETES: ICD-10-CM

## 2025-03-25 DIAGNOSIS — F17.200 TOBACCO DEPENDENCE SYNDROME: ICD-10-CM

## 2025-03-25 DIAGNOSIS — Z12.5 SCREENING PSA (PROSTATE SPECIFIC ANTIGEN): ICD-10-CM

## 2025-03-25 DIAGNOSIS — R71.8 ELEVATED HEMATOCRIT: ICD-10-CM

## 2025-03-25 DIAGNOSIS — E78.49 OTHER HYPERLIPIDEMIA: ICD-10-CM

## 2025-03-25 LAB
ALBUMIN SERPL-MCNC: 4.6 G/DL (ref 3.8–4.9)
ALP SERPL-CCNC: 60 IU/L (ref 44–121)
ALT SERPL-CCNC: 30 IU/L (ref 0–44)
AST SERPL-CCNC: 16 IU/L (ref 0–40)
BASOPHILS # BLD AUTO: 0 X10E3/UL (ref 0–0.2)
BASOPHILS NFR BLD AUTO: 1 %
BILIRUB SERPL-MCNC: 0.5 MG/DL (ref 0–1.2)
BUN SERPL-MCNC: 17 MG/DL (ref 6–24)
BUN/CREAT SERPL: 15 (ref 9–20)
CALCIUM SERPL-MCNC: 9.2 MG/DL (ref 8.7–10.2)
CHLORIDE SERPL-SCNC: 104 MMOL/L (ref 96–106)
CHOLEST SERPL-MCNC: 166 MG/DL (ref 100–199)
CHOLEST/HDLC SERPL: 5.2 RATIO (ref 0–5)
CO2 SERPL-SCNC: 26 MMOL/L (ref 20–29)
CREAT SERPL-MCNC: 1.16 MG/DL (ref 0.76–1.27)
EGFRCR SERPLBLD CKD-EPI 2021: 74 ML/MIN/1.73
EOSINOPHIL # BLD AUTO: 0.1 X10E3/UL (ref 0–0.4)
EOSINOPHIL NFR BLD AUTO: 2 %
ERYTHROCYTE [DISTWIDTH] IN BLOOD BY AUTOMATED COUNT: 13 % (ref 11.6–15.4)
GLOBULIN SER CALC-MCNC: 2.4 G/DL (ref 1.5–4.5)
GLUCOSE SERPL-MCNC: 126 MG/DL (ref 70–99)
HBA1C MFR BLD: 6.3 % (ref 4.8–5.6)
HCT VFR BLD AUTO: 52.7 % (ref 37.5–51)
HDLC SERPL-MCNC: 32 MG/DL
HGB BLD-MCNC: 17.8 G/DL (ref 13–17.7)
IMM GRANULOCYTES # BLD AUTO: 0 X10E3/UL (ref 0–0.1)
IMM GRANULOCYTES NFR BLD AUTO: 0 %
IRON SATN MFR SERPL: 31 % (ref 15–55)
IRON SERPL-MCNC: 94 UG/DL (ref 38–169)
LDLC SERPL CALC-MCNC: 107 MG/DL (ref 0–99)
LYMPHOCYTES # BLD AUTO: 1.8 X10E3/UL (ref 0.7–3.1)
LYMPHOCYTES NFR BLD AUTO: 32 %
MCH RBC QN AUTO: 30 PG (ref 26.6–33)
MCHC RBC AUTO-ENTMCNC: 33.8 G/DL (ref 31.5–35.7)
MCV RBC AUTO: 89 FL (ref 79–97)
MONOCYTES # BLD AUTO: 0.4 X10E3/UL (ref 0.1–0.9)
MONOCYTES NFR BLD AUTO: 8 %
NEUTROPHILS # BLD AUTO: 3.2 X10E3/UL (ref 1.4–7)
NEUTROPHILS NFR BLD AUTO: 57 %
PLATELET # BLD AUTO: 185 X10E3/UL (ref 150–450)
POTASSIUM SERPL-SCNC: 4.1 MMOL/L (ref 3.5–5.2)
PROT SERPL-MCNC: 7 G/DL (ref 6–8.5)
PSA SERPL-MCNC: 1 NG/ML (ref 0–4)
RBC # BLD AUTO: 5.93 X10E6/UL (ref 4.14–5.8)
SODIUM SERPL-SCNC: 142 MMOL/L (ref 134–144)
TIBC SERPL-MCNC: 303 UG/DL (ref 250–450)
TRIGL SERPL-MCNC: 149 MG/DL (ref 0–149)
TSH SERPL DL<=0.005 MIU/L-ACNC: 1.82 UIU/ML (ref 0.45–4.5)
UIBC SERPL-MCNC: 209 UG/DL (ref 111–343)
VLDLC SERPL CALC-MCNC: 27 MG/DL (ref 5–40)
WBC # BLD AUTO: 5.5 X10E3/UL (ref 3.4–10.8)

## 2025-03-25 NOTE — PROGRESS NOTES
Chief Complaint  Annual Exam, Hypertension, Hyperlipidemia, and Prediabetes    Subjective          Aditya Barnes presents to [facility] for annual exam, hypertension, prediabetes hyperlipidemia and elevated H&H    History of Present Illness    Patient is here for annual exam.  He has a history of hypertension is compliant with his medicines.  He does not take his blood pressure on a regular basis.    He does to tobacco.  Over the last few CBCs he has had elevation in RBCs occasionally in hematocrit and hemoglobin.  Those are race now.  He has not cut back on the right of tobacco consumption.  He and I went over the labs today and encouraged him to have hematology referral  RBCs and hematocrit continue to increase.     Patient has prediabetes and drinks regular soft drinks.  We discussed that today.  Mild increase in A1c blood sugar 126, A1c 6.3.  Discussed diabetes and prediabetes    HDL is low at 32 .  Increase exercise    TSH is normal  PSA is normal    Thyroid profile normal    Patient smashed his thumb at work last week, has a subungual hematoma.  Reports it feels better now.  No sign of infection    Aditya Barnes  has a past medical history of Allergic, Hyperlipidemia, Hypertension, and Smokeless tobacco use.      Review of Systems   Constitutional:  Negative for fatigue.   HENT:  Negative for ear pain, sinus pain and sore throat.    Eyes:  Negative for pain.   Respiratory:  Negative for cough and shortness of breath.    Cardiovascular:  Negative for chest pain.   Gastrointestinal:  Negative for abdominal pain, diarrhea and nausea.   Endocrine: Negative for polyuria.   Genitourinary:  Negative for decreased urine volume and dysuria.   Musculoskeletal:  Negative for arthralgias.        Hematoma   Skin:  Negative for rash.   Allergic/Immunologic: Negative for environmental allergies.   Neurological:  Negative for dizziness, numbness and headaches.   Hematological:  Does  "not bruise/bleed easily.   Psychiatric/Behavioral:  The patient is not nervous/anxious.         Objective       Current Outpatient Medications:     lisinopril-hydrochlorothiazide (PRINZIDE,ZESTORETIC) 20-12.5 MG per tablet, Take 1 tablet by mouth Every Morning., Disp: 90 tablet, Rfl: 1    metoprolol succinate XL (TOPROL-XL) 50 MG 24 hr tablet, Take 1 tablet by mouth Daily., Disp: 90 tablet, Rfl: 1    Vital Signs:      /82 (BP Location: Left arm, Patient Position: Sitting, Cuff Size: Small Adult)   Pulse 78   Temp 97.5 °F (36.4 °C) (Infrared)   Resp 16   Ht 179.1 cm (70.5\")   Wt 86.6 kg (191 lb)   SpO2 95%   BMI 27.02 kg/m²     Vitals:    03/25/25 1105   BP: 137/82   BP Location: Left arm   Patient Position: Sitting   Cuff Size: Small Adult   Pulse: 78   Resp: 16   Temp: 97.5 °F (36.4 °C)   TempSrc: Infrared   SpO2: 95%   Weight: 86.6 kg (191 lb)   Height: 179.1 cm (70.5\")      Physical Exam  Vitals and nursing note reviewed.   Constitutional:       Appearance: Normal appearance. He is well-developed.   HENT:      Head: Normocephalic.      Right Ear: Tympanic membrane, ear canal and external ear normal.      Left Ear: Tympanic membrane, ear canal and external ear normal.      Nose: Nose normal.      Mouth/Throat:      Lips: Pink.      Mouth: Mucous membranes are moist.      Pharynx: Oropharynx is clear. Uvula midline.   Eyes:      General: Lids are normal. Vision grossly intact.      Conjunctiva/sclera: Conjunctivae normal.      Pupils: Pupils are equal, round, and reactive to light.   Neck:      Thyroid: No thyroid mass or thyromegaly.   Cardiovascular:      Rate and Rhythm: Regular rhythm.      Heart sounds: S1 normal and S2 normal. No murmur heard.     No friction rub. No gallop.   Pulmonary:      Effort: No respiratory distress.      Breath sounds: Normal breath sounds. No decreased breath sounds, wheezing or rales.   Chest:      Chest wall: No mass or tenderness.   Abdominal:      General: Bowel " sounds are normal. There is no distension.      Palpations: Abdomen is soft.      Tenderness: There is no abdominal tenderness.      Hernia: No hernia is present.   Musculoskeletal:         General: Normal range of motion.      Cervical back: Normal range of motion and neck supple.      Comments: Right thumb with a subungual hematoma.  No sign of infection   Lymphadenopathy:      Cervical: No cervical adenopathy.   Skin:     General: Skin is warm and dry.   Neurological:      General: No focal deficit present.      Mental Status: He is alert and oriented to person, place, and time.      Sensory: No sensory deficit.      Motor: Motor function is intact.      Coordination: Coordination is intact.      Gait: Gait is intact.      Deep Tendon Reflexes: Reflexes are normal and symmetric.   Psychiatric:         Mood and Affect: Mood normal. Mood is not anxious or depressed.         Speech: Speech normal.         Behavior: Behavior normal.         Thought Content: Thought content normal.         Judgment: Judgment normal.        Result Review :                  Little interest or pleasure in doing things? Not at all   Feeling down, depressed, or hopeless? Not at all   PHQ-2 Total Score 0   Trouble falling or staying asleep, or sleeping too much? Not at all   Feeling tired or having little energy? Not at all   Poor appetite or overeating? Not at all   Feeling bad about yourself - or that you are a failure or have let yourself or your family down? Not at all   Trouble concentrating on things, such as reading the newspaper or watching television? Not at all   Moving or speaking so slowly that other people could have noticed? Or the opposite - being so fidgety or restless that you have been moving around a lot more than usual? Not at all     Thoughts that you would be better off dead, or of hurting yourself in some way? Not at all   PHQ-9 Total Score 0   If you checked off any problems, how difficult have these problems made it  for you to do your work, take care of things at home, or get along with other people? Not difficult at all                 Assessment and Plan    Diagnoses and all orders for this visit:    1. Annual physical exam (Primary)  Comments:  Anticipatory guidance done    2. Tobacco dependence syndrome  Assessment & Plan:  Continues to chew tobacco                                          Orders:  -     Ambulatory Referral to Hematology    3. Primary hypertension  Assessment & Plan:  Hypertension is stable and controlled  Continue current treatment regimen.  Blood pressure will be reassessed in 6 months.      4. Abnormal CBC measurement  Assessment & Plan:  Referred to hematology.  Discussed causes briefly.  Encouraged to follow through    Orders:  -     Ambulatory Referral to Hematology    5. Other hyperlipidemia  Assessment & Plan:    Discussed HDL and increasing activity      6. Screening PSA (prostate specific antigen)    7. Overweight (BMI 25.0-29.9)  Assessment & Plan:  Patient's (Body mass index is 27.02 kg/m².) indicates that they are overweight with health conditions that include hypertension, impaired fasting glucose, and dyslipidemias . Weight is unchanged. BMI is above average; BMI management plan is completed. We discussed portion control and increasing exercise.       8. Umbilical hernia without obstruction and without gangrene  Assessment & Plan:  Stable will refer if worse      9. Pre-diabetes  Assessment & Plan:  Discussed diet and exercise      10. Elevated hemoglobin  -     Ambulatory Referral to Hematology    11. Elevated hematocrit  -     Ambulatory Referral to Hematology    12. Screening for colon cancer  Comments:  Encouraged    13. BMI 27.0-27.9,adult         Problem List Items Addressed This Visit          Active Problems    Hypertension    Current Assessment & Plan   Hypertension is stable and controlled  Continue current treatment regimen.  Blood pressure will be reassessed in 6 months.          Tobacco dependence syndrome    Current Assessment & Plan   Continues to chew tobacco                                               Relevant Orders    Ambulatory Referral to Hematology    Hyperlipidemia    Current Assessment & Plan     Discussed HDL and increasing activity         Screening PSA (prostate specific antigen)    BMI 27.0-27.9,adult    Overweight (BMI 25.0-29.9)    Current Assessment & Plan   Patient's (Body mass index is 27.02 kg/m².) indicates that they are overweight with health conditions that include hypertension, impaired fasting glucose, and dyslipidemias . Weight is unchanged. BMI is above average; BMI management plan is completed. We discussed portion control and increasing exercise.          Umbilical hernia without obstruction and without gangrene    Current Assessment & Plan   Stable will refer if worse         Abnormal CBC measurement    Current Assessment & Plan   Referred to hematology.  Discussed causes briefly.  Encouraged to follow through         Relevant Orders    Ambulatory Referral to Hematology    Pre-diabetes    Current Assessment & Plan   Discussed diet and exercise          Other Visit Diagnoses         Annual physical exam    -  Primary    Anticipatory guidance done      Elevated hemoglobin        Relevant Orders    Ambulatory Referral to Hematology      Elevated hematocrit        Relevant Orders    Ambulatory Referral to Hematology      Screening for colon cancer        Encouraged            Follow Up   Return in about 6 months (around 9/25/2025) for Recheck HTN.  Patient was given instructions and counseling regarding his condition or for health maintenance advice. Please see specific information pulled into the AVS if appropriate.

## 2025-03-25 NOTE — ASSESSMENT & PLAN NOTE
Patient's (Body mass index is 27.02 kg/m².) indicates that they are overweight with health conditions that include hypertension, impaired fasting glucose, and dyslipidemias . Weight is unchanged. BMI is above average; BMI management plan is completed. We discussed portion control and increasing exercise.

## 2025-04-08 RX ORDER — METOPROLOL SUCCINATE 50 MG/1
50 TABLET, EXTENDED RELEASE ORAL DAILY
Qty: 90 TABLET | Refills: 1 | Status: SHIPPED | OUTPATIENT
Start: 2025-04-08

## 2025-04-08 RX ORDER — LISINOPRIL AND HYDROCHLOROTHIAZIDE 12.5; 2 MG/1; MG/1
1 TABLET ORAL EVERY MORNING
Qty: 90 TABLET | Refills: 1 | Status: SHIPPED | OUTPATIENT
Start: 2025-04-08

## 2025-05-13 NOTE — PROGRESS NOTES
HEMATOLOGY ONCOLOGY OUTPATIENT CONSULTATION       Patient name: Aditya Barnes  : 1970  MRN: [id number]  Primary Care Physician: Martita Zhang APRN  Referring Physician: Martita Zhang APRN  Reason For Consult:       History of Present Illness:  Patient is a 54-year-old male who has been referred to us for further evaluation and management of erythrocytosis.  His most recent labs are reviewed and are summarized as follows:    3/24/2025:  CBC: 5.5/17.8/52.7/185    On review of labs, it appears that he has persistently elevated hematocrit since .    He works as a  at [employer], Denied any history of smoking. Has never been tested for sleep apnea.      PMH:  HTN  Nephrolithiasis: s/p Surgery   preDM      Subjective:  Patient presents for initial consultation today. Clinically doing well, denied any acute symptoms.      Past Medical History:   Diagnosis Date    Allergic     Penicillin    Hyperlipidemia     Hypertension     Smokeless tobacco use        Past Surgical History:   Procedure Laterality Date    CHOLECYSTECTOMY      [facility]    CYSTOSCOPY BLADDER STONE LITHOTRIPSY      [facility]    ROTATOR CUFF REPAIR      left         Current Outpatient Medications:     lisinopril-hydrochlorothiazide (PRINZIDE,ZESTORETIC) 20-12.5 MG per tablet, TAKE 1 TABLET BY MOUTH ONCE DAILY IN THE MORNING, Disp: 90 tablet, Rfl: 1    metoprolol succinate XL (TOPROL-XL) 50 MG 24 hr tablet, Take 1 tablet by mouth once daily, Disp: 90 tablet, Rfl: 1    Allergies   Allergen Reactions    Penicillin G Rash       Family History   Problem Relation Age of Onset    Thyroid disease Mother        Cancer-related family history is not on file.      Social History     Tobacco Use    Smoking status: Never    Smokeless tobacco: Current     Types: Snuff    Tobacco comments:     Smokeless tobacco   Vaping Use    Vaping status: Never Used   Substance Use Topics    Alcohol use: Yes     Alcohol/week: 2.0 standard  "drinks of alcohol     Types: 1 Glasses of wine, 1 Cans of beer per week     Comment: social    Drug use: No     Social History     Social History Narrative    Not on file       ROS:   Review of Systems   Constitutional: Negative.    HENT: Negative.     Eyes: Negative.    Respiratory: Negative.     Cardiovascular: Negative.    Gastrointestinal: Negative.    Endocrine: Negative.    Genitourinary: Negative.    Musculoskeletal: Negative.    Skin: Negative.    Allergic/Immunologic: Negative.    Neurological: Negative.    Hematological: Negative.    Psychiatric/Behavioral: Negative.           Objective:    Vital Signs:  Vitals:    05/15/25 0843   BP: 154/93   Pulse: 72   SpO2: 96%   Weight: 85.9 kg (189 lb 6.4 oz)   Height: 179.1 cm (70.5\")   PainSc: 0-No pain     Body mass index is 26.79 kg/m².    ECOG Status  (0) Fully active, able to carry on all predisease performance without restriction      Physical Exam:   Physical Exam  Constitutional:       Appearance: Normal appearance. He is normal weight.   HENT:      Head: Normocephalic and atraumatic.      Right Ear: External ear normal.      Left Ear: External ear normal.      Nose: Nose normal.      Mouth/Throat:      Mouth: Mucous membranes are moist.      Pharynx: Oropharynx is clear.   Eyes:      Extraocular Movements: Extraocular movements intact.      Conjunctiva/sclera: Conjunctivae normal.      Pupils: Pupils are equal, round, and reactive to light.   Cardiovascular:      Rate and Rhythm: Normal rate.      Pulses: Normal pulses.   Pulmonary:      Effort: Pulmonary effort is normal.   Abdominal:      General: Abdomen is flat.      Palpations: Abdomen is soft.   Musculoskeletal:         General: Normal range of motion.      Cervical back: Normal range of motion and neck supple.   Skin:     General: Skin is warm.   Neurological:      Mental Status: He is alert.   Psychiatric:         Mood and Affect: Mood normal.         Behavior: Behavior normal.         Thought " "Content: Thought content normal.         Judgment: Judgment normal.         Lab Results - Last 18 Months   Lab Units 03/24/25  0745 08/27/24  0741 01/31/24  0937   WBC x10E3/uL 5.5 5.7 5.9   HEMOGLOBIN g/dL 17.8* 17.4 17.5   HEMATOCRIT % 52.7* 53.2* 51.2*   PLATELETS x10E3/uL 185 186 185   MCV fL 89 92 88     Lab Results - Last 18 Months   Lab Units 03/24/25  0745 08/27/24  0741 01/31/24  0937   SODIUM mmol/L 142 143 143   POTASSIUM mmol/L 4.1 CANCELED 4.4   CHLORIDE mmol/L 104 105 104   CO2 mmol/L 26 21 23   BUN mg/dL 17 19 16   CREATININE mg/dL 1.16 1.15 1.16   CALCIUM mg/dL 9.2 9.4 9.4   BILIRUBIN mg/dL 0.5 0.3 0.4   ALK PHOS IU/L 60 62 64   ALT (SGPT) IU/L 30 23 31   AST (SGOT) IU/L 16 17 19   GLUCOSE mg/dL 126* CANCELED 110*       Lab Results   Component Value Date    GLUCOSE 126 (H) 03/24/2025    BUN 17 03/24/2025    CREATININE 1.16 03/24/2025    EGFRIFNONA 71 02/01/2022    EGFRIFAFRI 82 02/01/2022    BCR 15 03/24/2025    K 4.1 03/24/2025    CO2 26 03/24/2025    CALCIUM 9.2 03/24/2025    ALBUMIN 4.6 03/24/2025    AST 16 03/24/2025    ALT 30 03/24/2025       No results for input(s): \"APTT\", \"INR\", \"PTT\" in the last 21079 hours.    Lab Results   Component Value Date    TIBC 303 03/24/2025    FERRITIN 241 03/22/2023       No results found for: \"FOLATE\"    No results found for: \"OCCULTBLD\"    No results found for: \"RETICCTPCT\"  No results found for: \"VHLBOAUI64\"  No results found for: \"SPEP\", \"UPEP\"  No results found for: \"LDH\", \"URICACID\"  No results found for: \"REDDY\", \"RF\", \"SEDRATE\"  No results found for: \"FIBRINOGEN\", \"HAPTOGLOBIN\"  No results found for: \"PTT\", \"INR\"  No results found for: \"\"  No results found for: \"CEA\"  No components found for: \"CA-19-9\"  Lab Results   Component Value Date    PSA 1.0 03/24/2025     No results found for: \"SEDRATE\"       Assessment & Plan     Erythrocytosis/polycythemia:  -Outside labs were reviewed, noted elevated Hb/hematocrit on recent outside labs.  -patient denied any " underlying history of smoking or sleep apnea, he is not on testosterone supplementation.  -Patient denied any plethoric symptoms associated with increased hematocrit.  -Explained to the patient that in most cases erythrocytosis is secondary to underlying health issues such as BELIA, smoking, COPD, CHF etc. and only in a small minority of patients this is secondary to underlying primary hematologic disorder such as polycythemia vera, CML etc.  -Will recheck serum EPO levels, JAK2 mutation analysis and BCR-ABL PCR  -Counseled patient that in Setting of secondary erythrocytosis, there is limited role for therapeutic phlebotomy for symptomatic management or in case of elevated hematocrit >54  -consider referral to sleep medicine if workup for hematologic disorders is negative..        Cancer screening:   Will discuss about Colon cancer surveillance with Colonoscopy.  Patient denied smoking history, no indication for lung cancer screening      6 week telehealth follow up to discuss lab results, sooner as needed.        Thank you very much for providing the opportunity to participate in this patient’s care. Please do not hesitate to call if there are any other questions.

## 2025-05-15 ENCOUNTER — CONSULT (OUTPATIENT)
Dept: ONCOLOGY | Facility: CLINIC | Age: 55
End: 2025-05-15
Payer: COMMERCIAL

## 2025-05-15 ENCOUNTER — LAB (OUTPATIENT)
Dept: LAB | Facility: HOSPITAL | Age: 55
End: 2025-05-15
Payer: COMMERCIAL

## 2025-05-15 VITALS
HEART RATE: 72 BPM | HEIGHT: 71 IN | WEIGHT: 189.4 LBS | OXYGEN SATURATION: 96 % | DIASTOLIC BLOOD PRESSURE: 93 MMHG | BODY MASS INDEX: 26.52 KG/M2 | SYSTOLIC BLOOD PRESSURE: 154 MMHG

## 2025-05-15 DIAGNOSIS — R71.8 ELEVATED FERRITIN, HEMOGLOBIN, AND RED BLOOD CELL COUNT: Primary | ICD-10-CM

## 2025-05-15 DIAGNOSIS — D58.2 ELEVATED FERRITIN, HEMOGLOBIN, AND RED BLOOD CELL COUNT: Primary | ICD-10-CM

## 2025-05-15 DIAGNOSIS — D58.2 ELEVATED FERRITIN, HEMOGLOBIN, AND RED BLOOD CELL COUNT: ICD-10-CM

## 2025-05-15 DIAGNOSIS — R79.89 ELEVATED FERRITIN, HEMOGLOBIN, AND RED BLOOD CELL COUNT: ICD-10-CM

## 2025-05-15 DIAGNOSIS — R71.8 ELEVATED FERRITIN, HEMOGLOBIN, AND RED BLOOD CELL COUNT: ICD-10-CM

## 2025-05-15 DIAGNOSIS — R79.89 ELEVATED FERRITIN, HEMOGLOBIN, AND RED BLOOD CELL COUNT: Primary | ICD-10-CM

## 2025-05-15 LAB
ALBUMIN SERPL-MCNC: 4.9 G/DL (ref 3.5–5.2)
ALBUMIN/GLOB SERPL: 1.9 G/DL
ALP SERPL-CCNC: 62 U/L (ref 39–117)
ALT SERPL W P-5'-P-CCNC: 41 U/L (ref 1–41)
ANION GAP SERPL CALCULATED.3IONS-SCNC: 8.7 MMOL/L (ref 5–15)
AST SERPL-CCNC: 29 U/L (ref 1–40)
BASOPHILS # BLD AUTO: 0.02 10*3/MM3 (ref 0–0.2)
BASOPHILS NFR BLD AUTO: 0.4 % (ref 0–1.5)
BILIRUB SERPL-MCNC: 0.5 MG/DL (ref 0–1.2)
BUN SERPL-MCNC: 18 MG/DL (ref 6–20)
BUN/CREAT SERPL: 18.9 (ref 7–25)
CALCIUM SPEC-SCNC: 9.7 MG/DL (ref 8.6–10.5)
CHLORIDE SERPL-SCNC: 104 MMOL/L (ref 98–107)
CO2 SERPL-SCNC: 27.3 MMOL/L (ref 22–29)
CREAT SERPL-MCNC: 0.95 MG/DL (ref 0.76–1.27)
DEPRECATED RDW RBC AUTO: 43.1 FL (ref 37–54)
EGFRCR SERPLBLD CKD-EPI 2021: 94.5 ML/MIN/1.73
EOSINOPHIL # BLD AUTO: 0.18 10*3/MM3 (ref 0–0.4)
EOSINOPHIL NFR BLD AUTO: 3.9 % (ref 0.3–6.2)
ERYTHROCYTE [DISTWIDTH] IN BLOOD BY AUTOMATED COUNT: 13.5 % (ref 12.3–15.4)
FERRITIN SERPL-MCNC: 208 NG/ML (ref 30–400)
GLOBULIN UR ELPH-MCNC: 2.6 GM/DL
GLUCOSE SERPL-MCNC: 121 MG/DL (ref 65–99)
HCT VFR BLD AUTO: 51 % (ref 37.5–51)
HGB BLD-MCNC: 17.8 G/DL (ref 13–17.7)
IRON 24H UR-MRATE: 90 MCG/DL (ref 59–158)
IRON SATN MFR SERPL: 23 % (ref 20–50)
LDH SERPL-CCNC: 137 U/L (ref 135–225)
LYMPHOCYTES # BLD AUTO: 1.6 10*3/MM3 (ref 0.7–3.1)
LYMPHOCYTES NFR BLD AUTO: 35.1 % (ref 19.6–45.3)
MCH RBC QN AUTO: 30.8 PG (ref 26.6–33)
MCHC RBC AUTO-ENTMCNC: 34.9 G/DL (ref 31.5–35.7)
MCV RBC AUTO: 88.4 FL (ref 79–97)
MONOCYTES # BLD AUTO: 0.37 10*3/MM3 (ref 0.1–0.9)
MONOCYTES NFR BLD AUTO: 8.1 % (ref 5–12)
NEUTROPHILS NFR BLD AUTO: 2.39 10*3/MM3 (ref 1.7–7)
NEUTROPHILS NFR BLD AUTO: 52.5 % (ref 42.7–76)
PLATELET # BLD AUTO: 161 10*3/MM3 (ref 140–450)
PMV BLD AUTO: 10.7 FL (ref 6–12)
POTASSIUM SERPL-SCNC: 4.1 MMOL/L (ref 3.5–5.2)
PROT SERPL-MCNC: 7.5 G/DL (ref 6–8.5)
RBC # BLD AUTO: 5.77 10*6/MM3 (ref 4.14–5.8)
SODIUM SERPL-SCNC: 140 MMOL/L (ref 136–145)
TIBC SERPL-MCNC: 393 MCG/DL (ref 298–536)
TRANSFERRIN SERPL-MCNC: 264 MG/DL (ref 200–360)
WBC NRBC COR # BLD AUTO: 4.56 10*3/MM3 (ref 3.4–10.8)

## 2025-05-15 PROCEDURE — 85025 COMPLETE CBC W/AUTO DIFF WBC: CPT

## 2025-05-15 PROCEDURE — 80053 COMPREHEN METABOLIC PANEL: CPT | Performed by: STUDENT IN AN ORGANIZED HEALTH CARE EDUCATION/TRAINING PROGRAM

## 2025-05-15 PROCEDURE — 83615 LACTATE (LD) (LDH) ENZYME: CPT | Performed by: STUDENT IN AN ORGANIZED HEALTH CARE EDUCATION/TRAINING PROGRAM

## 2025-05-15 PROCEDURE — 36415 COLL VENOUS BLD VENIPUNCTURE: CPT

## 2025-05-15 PROCEDURE — 82728 ASSAY OF FERRITIN: CPT | Performed by: STUDENT IN AN ORGANIZED HEALTH CARE EDUCATION/TRAINING PROGRAM

## 2025-05-15 PROCEDURE — 84466 ASSAY OF TRANSFERRIN: CPT | Performed by: STUDENT IN AN ORGANIZED HEALTH CARE EDUCATION/TRAINING PROGRAM

## 2025-05-15 PROCEDURE — 83540 ASSAY OF IRON: CPT | Performed by: STUDENT IN AN ORGANIZED HEALTH CARE EDUCATION/TRAINING PROGRAM

## 2025-05-16 ENCOUNTER — PATIENT ROUNDING (BHMG ONLY) (OUTPATIENT)
Dept: ONCOLOGY | Facility: CLINIC | Age: 55
End: 2025-05-16
Payer: COMMERCIAL

## 2025-05-16 LAB — EPO SERPL-ACNC: 12.9 MIU/ML (ref 2.6–18.5)

## 2025-05-16 NOTE — PROGRESS NOTES
May 16, 2025    Hello, may I speak with Aditya Barnes?    My name is Verito Ball      I am  with [facility]  [address] [place] [address] [phone].    Before we get started may I verify your date of birth? 1970    I am calling to officially welcome you to our practice and ask about your recent visit. Is this a good time to talk? no    Tell me about your visit with us. What things went well?  A My Chart message was sent to the patient.         We're always looking for ways to make our patients' experiences even better. Do you have recommendations on ways we may improve?  no    Overall were you satisfied with your first visit to our practice? yes       I appreciate you taking the time to speak with me today. Is there anything else I can do for you? no      Thank you, and have a great day.

## 2025-05-20 LAB
LAB DIRECTOR NAME PROVIDER: NORMAL
LABORATORY COMMENT REPORT: NORMAL
OBSERVATION IMP: NEGATIVE
REF LAB TEST METHOD: NORMAL
T(ABL1,BCR)B2A2/CONTROL BLD/T: NORMAL %
T(ABL1,BCR)B3A2/CONTROL BLD/T: NORMAL %
T(ABL1,BCR)E1A2/CONTROL BLD/T: NORMAL %
T(ABL1,BCR)E1A2/CONTROL BLD/T: NORMAL %

## 2025-05-22 LAB
JAK2 P.V617F BLD/T QL: NORMAL
LAB DIRECTOR NAME PROVIDER: NORMAL
LABORATORY COMMENT REPORT: NORMAL

## 2025-06-27 ENCOUNTER — TELEMEDICINE (OUTPATIENT)
Dept: ONCOLOGY | Facility: CLINIC | Age: 55
End: 2025-06-27
Payer: COMMERCIAL

## 2025-06-27 DIAGNOSIS — R71.8 ELEVATED FERRITIN, HEMOGLOBIN, AND RED BLOOD CELL COUNT: Primary | ICD-10-CM

## 2025-06-27 DIAGNOSIS — D58.2 ELEVATED FERRITIN, HEMOGLOBIN, AND RED BLOOD CELL COUNT: Primary | ICD-10-CM

## 2025-06-27 DIAGNOSIS — R29.818 SUSPECTED SLEEP APNEA: ICD-10-CM

## 2025-06-27 DIAGNOSIS — R79.89 ELEVATED FERRITIN, HEMOGLOBIN, AND RED BLOOD CELL COUNT: Primary | ICD-10-CM

## 2025-06-27 NOTE — PROGRESS NOTES
HEMATOLOGY ONCOLOGY OUTPATIENT FOLLOW UP       Patient name: Aditya Barnes  : 1970  MRN: [id number]  Primary Care Physician: Martita Zhang APRN  Referring Physician: No ref. provider found  Reason For Consult:       History of Present Illness:  Patient is a 54-year-old male who has been referred to us for further evaluation and management of erythrocytosis.  His most recent labs are reviewed and are summarized as follows:    3/24/2025:  CBC: 5.5/17.8/52.7/185    On review of labs, it appears that he has persistently elevated hematocrit since .    He works as a  at [employer], Denied any history of smoking. Has never been tested for sleep apnea.      PMH:  HTN  Nephrolithiasis: s/p Surgery   preDM      Subjective:  25; Patient Aidtya Barnes was located at home and I was located at [facility] for this telemedicine encounter. patient and I were able to hear [and see] each other simultaneously in real time. I introduced myself and verified patient's identity. I explained how the telemedicine visit will occur. I advised the patient that technology-related delays and breaches of privacy are potential risks associated with conducting the encounter via telemedicine.Having covered these considerations, patient verbally acknowledged them and gave consent for the use of telemedicine in his/her care.     No new complaints reported today. Clinically stable.        Past Medical History:   Diagnosis Date    Allergic     Penicillin    Hyperlipidemia     Hypertension     Smokeless tobacco use        Past Surgical History:   Procedure Laterality Date    CHOLECYSTECTOMY      [facility]    CYSTOSCOPY BLADDER STONE LITHOTRIPSY      [facility]    ROTATOR CUFF REPAIR      left         Current Outpatient Medications:     lisinopril-hydrochlorothiazide (PRINZIDE,ZESTORETIC) 20-12.5 MG per tablet, TAKE 1 TABLET BY MOUTH ONCE DAILY IN THE MORNING, Disp: 90 tablet, Rfl: 1     metoprolol succinate XL (TOPROL-XL) 50 MG 24 hr tablet, Take 1 tablet by mouth once daily, Disp: 90 tablet, Rfl: 1    Allergies   Allergen Reactions    Penicillin G Rash       Family History   Problem Relation Age of Onset    Thyroid disease Mother        Cancer-related family history is not on file.      Social History     Tobacco Use    Smoking status: Never    Smokeless tobacco: Current     Types: Snuff    Tobacco comments:     Smokeless tobacco   Vaping Use    Vaping status: Never Used   Substance Use Topics    Alcohol use: Yes     Alcohol/week: 2.0 standard drinks of alcohol     Types: 1 Glasses of wine, 1 Cans of beer per week     Comment: social    Drug use: No     Social History     Social History Narrative    Not on file       ROS:   Review of Systems   Constitutional: Negative.    HENT: Negative.     Eyes: Negative.    Respiratory: Negative.     Cardiovascular: Negative.    Gastrointestinal: Negative.    Endocrine: Negative.    Genitourinary: Negative.    Musculoskeletal: Negative.    Skin: Negative.    Allergic/Immunologic: Negative.    Neurological: Negative.    Hematological: Negative.    Psychiatric/Behavioral: Negative.           Objective:    Vital Signs:  There were no vitals filed for this visit.    There is no height or weight on file to calculate BMI.    ECOG Status  (0) Fully active, able to carry on all predisease performance without restriction      Physical Exam:   Physical Exam  Constitutional:       Appearance: Normal appearance. He is normal weight.   HENT:      Head: Normocephalic and atraumatic.      Right Ear: External ear normal.      Left Ear: External ear normal.      Nose: Nose normal.      Mouth/Throat:      Mouth: Mucous membranes are moist.      Pharynx: Oropharynx is clear.   Eyes:      Extraocular Movements: Extraocular movements intact.      Conjunctiva/sclera: Conjunctivae normal.      Pupils: Pupils are equal, round, and reactive to light.   Cardiovascular:      Rate and  "Rhythm: Normal rate.      Pulses: Normal pulses.   Pulmonary:      Effort: Pulmonary effort is normal.   Abdominal:      General: Abdomen is flat.      Palpations: Abdomen is soft.   Musculoskeletal:         General: Normal range of motion.      Cervical back: Normal range of motion and neck supple.   Skin:     General: Skin is warm.   Neurological:      Mental Status: He is alert.   Psychiatric:         Mood and Affect: Mood normal.         Behavior: Behavior normal.         Thought Content: Thought content normal.         Judgment: Judgment normal.         Lab Results - Last 18 Months   Lab Units 05/15/25  0930 03/24/25  0745 08/27/24  0741   WBC 10*3/mm3 4.56 5.5 5.7   HEMOGLOBIN g/dL 17.8* 17.8* 17.4   HEMATOCRIT % 51.0 52.7* 53.2*   PLATELETS 10*3/mm3 161 185 186   MCV fL 88.4 89 92     Lab Results - Last 18 Months   Lab Units 05/15/25  0930 03/24/25  0745 08/27/24  0741   SODIUM mmol/L 140 142 143   POTASSIUM mmol/L 4.1 4.1 CANCELED   CHLORIDE mmol/L 104 104 105   CO2 mmol/L 27.3 26 21   BUN mg/dL 18 17 19   CREATININE mg/dL 0.95 1.16 1.15   CALCIUM mg/dL 9.7 9.2 9.4   BILIRUBIN mg/dL 0.5 0.5 0.3   ALK PHOS U/L 62 60 62   ALT (SGPT) U/L 41 30 23   AST (SGOT) U/L 29 16 17   GLUCOSE mg/dL 121* 126* CANCELED       Lab Results   Component Value Date    GLUCOSE 121 (H) 05/15/2025    BUN 18 05/15/2025    CREATININE 0.95 05/15/2025    EGFRIFNONA 71 02/01/2022    EGFRIFAFRI 82 02/01/2022    BCR 18.9 05/15/2025    K 4.1 05/15/2025    CO2 27.3 05/15/2025    CALCIUM 9.7 05/15/2025    ALBUMIN 4.9 05/15/2025    AST 29 05/15/2025    ALT 41 05/15/2025       No results for input(s): \"APTT\", \"INR\", \"PTT\" in the last 34579 hours.    Lab Results   Component Value Date    IRON 90 05/15/2025    TIBC 393 05/15/2025    FERRITIN 208.00 05/15/2025       No results found for: \"FOLATE\"    No results found for: \"OCCULTBLD\"    No results found for: \"RETICCTPCT\"  No results found for: \"XCGRLSXX22\"  No results found for: \"SPEP\", \"UPEP\"  LDH " "  Date Value Ref Range Status   05/15/2025 137 135 - 225 U/L Final     No results found for: \"REDDY\", \"RF\", \"SEDRATE\"  No results found for: \"FIBRINOGEN\", \"HAPTOGLOBIN\"  No results found for: \"PTT\", \"INR\"  No results found for: \"\"  No results found for: \"CEA\"  No components found for: \"CA-19-9\"  Lab Results   Component Value Date    PSA 1.0 03/24/2025     No results found for: \"SEDRATE\"       Assessment & Plan     Erythrocytosis/polycythemia:  -Outside labs were reviewed, noted elevated Hb/hematocrit on recent outside labs.  -patient denied any underlying history of smoking or sleep apnea, he is not on testosterone supplementation.  -Patient denied any plethoric symptoms associated with increased hematocrit.  -Explained to the patient that in most cases erythrocytosis is secondary to underlying health issues such as BELIA, smoking, COPD, CHF etc. and only in a small minority of patients this is secondary to underlying primary hematologic disorder such as polycythemia vera, CML etc.  -ordered serum EPO levels, JAK2 mutation analysis and BCR-ABL PCR. These labs are reported unremarkable/negative.  -Counseled patient that in Setting of secondary erythrocytosis, there is limited role for therapeutic phlebotomy for symptomatic management or in case of elevated hematocrit >54  -Will place a referral to sleep medicine to rule out BELIA. Patient is agreeable to it.        Cancer screening:   Will discuss about Colon cancer surveillance with Colonoscopy.  Patient denied smoking history, no indication for lung cancer screening      3 month follow up with repeat labs, sooner as needed.        Thank you very much for providing the opportunity to participate in this patient’s care. Please do not hesitate to call if there are any other questions.    "